# Patient Record
Sex: FEMALE | Race: BLACK OR AFRICAN AMERICAN | NOT HISPANIC OR LATINO | Employment: FULL TIME | RURAL
[De-identification: names, ages, dates, MRNs, and addresses within clinical notes are randomized per-mention and may not be internally consistent; named-entity substitution may affect disease eponyms.]

---

## 2018-01-15 LAB — CRC RECOMMENDATION EXT: NORMAL

## 2020-10-12 ENCOUNTER — HISTORICAL (OUTPATIENT)
Dept: ADMINISTRATIVE | Facility: HOSPITAL | Age: 55
End: 2020-10-12

## 2021-03-06 DIAGNOSIS — Z12.31 SCREENING MAMMOGRAM FOR HIGH-RISK PATIENT: Primary | ICD-10-CM

## 2021-06-30 DIAGNOSIS — E78.5 HYPERLIPIDEMIA, UNSPECIFIED HYPERLIPIDEMIA TYPE: Primary | ICD-10-CM

## 2021-06-30 RX ORDER — ATORVASTATIN CALCIUM 20 MG/1
20 TABLET, FILM COATED ORAL NIGHTLY
COMMUNITY
Start: 2021-03-11 | End: 2021-06-30 | Stop reason: SDUPTHER

## 2021-06-30 RX ORDER — ATORVASTATIN CALCIUM 20 MG/1
20 TABLET, FILM COATED ORAL NIGHTLY
Qty: 30 TABLET | Refills: 3 | Status: SHIPPED | OUTPATIENT
Start: 2021-06-30 | End: 2021-11-23 | Stop reason: SDUPTHER

## 2021-10-18 ENCOUNTER — HOSPITAL ENCOUNTER (OUTPATIENT)
Dept: RADIOLOGY | Facility: HOSPITAL | Age: 56
Discharge: HOME | End: 2021-10-18
Payer: COMMERCIAL

## 2021-10-18 VITALS — BODY MASS INDEX: 30.86 KG/M2 | HEIGHT: 66 IN | WEIGHT: 192 LBS

## 2021-10-18 DIAGNOSIS — Z12.31 SCREENING MAMMOGRAM FOR HIGH-RISK PATIENT: ICD-10-CM

## 2021-10-18 PROCEDURE — 77067 SCR MAMMO BI INCL CAD: CPT | Mod: TC

## 2021-11-23 ENCOUNTER — OFFICE VISIT (OUTPATIENT)
Dept: FAMILY MEDICINE | Facility: CLINIC | Age: 56
End: 2021-11-23
Payer: COMMERCIAL

## 2021-11-23 VITALS
WEIGHT: 196.38 LBS | HEIGHT: 66 IN | HEART RATE: 76 BPM | BODY MASS INDEX: 31.56 KG/M2 | RESPIRATION RATE: 18 BRPM | OXYGEN SATURATION: 100 % | DIASTOLIC BLOOD PRESSURE: 82 MMHG | SYSTOLIC BLOOD PRESSURE: 135 MMHG

## 2021-11-23 DIAGNOSIS — N76.0 BACTERIAL VAGINOSIS: ICD-10-CM

## 2021-11-23 DIAGNOSIS — R79.89 ABNORMAL CBC: Primary | ICD-10-CM

## 2021-11-23 DIAGNOSIS — E78.5 HYPERLIPIDEMIA, UNSPECIFIED HYPERLIPIDEMIA TYPE: ICD-10-CM

## 2021-11-23 DIAGNOSIS — Z79.899 LONG-TERM USE OF HIGH-RISK MEDICATION: ICD-10-CM

## 2021-11-23 DIAGNOSIS — R79.9 ABNORMAL BLOOD CHEMISTRY: ICD-10-CM

## 2021-11-23 DIAGNOSIS — B96.89 BACTERIAL VAGINOSIS: ICD-10-CM

## 2021-11-23 DIAGNOSIS — E78.2 MIXED HYPERLIPIDEMIA: Chronic | ICD-10-CM

## 2021-11-23 LAB
ALBUMIN SERPL BCP-MCNC: 3.6 G/DL (ref 3.5–5)
ALBUMIN/GLOB SERPL: 0.7 {RATIO}
ALP SERPL-CCNC: 139 U/L (ref 41–108)
ALT SERPL W P-5'-P-CCNC: 20 U/L (ref 13–56)
ANION GAP SERPL CALCULATED.3IONS-SCNC: 10 MMOL/L (ref 7–16)
AST SERPL W P-5'-P-CCNC: 19 U/L (ref 15–37)
BASOPHILS # BLD AUTO: 0.03 K/UL (ref 0–0.2)
BASOPHILS NFR BLD AUTO: 1.2 % (ref 0–1)
BILIRUB SERPL-MCNC: 0.4 MG/DL (ref 0–1.2)
BUN SERPL-MCNC: 11 MG/DL (ref 7–18)
BUN/CREAT SERPL: 14 (ref 6–20)
CALCIUM SERPL-MCNC: 9.3 MG/DL (ref 8.5–10.1)
CHLORIDE SERPL-SCNC: 107 MMOL/L (ref 98–107)
CHOLEST SERPL-MCNC: 183 MG/DL (ref 0–200)
CHOLEST/HDLC SERPL: 3.6 {RATIO}
CO2 SERPL-SCNC: 28 MMOL/L (ref 21–32)
CREAT SERPL-MCNC: 0.8 MG/DL (ref 0.55–1.02)
DIFFERENTIAL METHOD BLD: ABNORMAL
EOSINOPHIL # BLD AUTO: 0.08 K/UL (ref 0–0.5)
EOSINOPHIL NFR BLD AUTO: 3.3 % (ref 1–4)
EOSINOPHIL NFR BLD MANUAL: 3 % (ref 1–4)
ERYTHROCYTE [DISTWIDTH] IN BLOOD BY AUTOMATED COUNT: 13.8 % (ref 11.5–14.5)
GLOBULIN SER-MCNC: 5 G/DL (ref 2–4)
GLUCOSE SERPL-MCNC: 94 MG/DL (ref 74–106)
HCT VFR BLD AUTO: 42.1 % (ref 38–47)
HDLC SERPL-MCNC: 51 MG/DL (ref 40–60)
HGB BLD-MCNC: 13.2 G/DL (ref 12–16)
IMM GRANULOCYTES # BLD AUTO: 0 K/UL (ref 0–0.04)
IMM GRANULOCYTES NFR BLD: 0 % (ref 0–0.4)
LDLC SERPL CALC-MCNC: 107 MG/DL
LDLC/HDLC SERPL: 2.1 {RATIO}
LYMPHOCYTES # BLD AUTO: 0.9 K/UL (ref 1–4.8)
LYMPHOCYTES NFR BLD AUTO: 37.2 % (ref 27–41)
LYMPHOCYTES NFR BLD MANUAL: 38 % (ref 27–41)
MCH RBC QN AUTO: 25.5 PG (ref 27–31)
MCHC RBC AUTO-ENTMCNC: 31.4 G/DL (ref 32–36)
MCV RBC AUTO: 81.3 FL (ref 80–96)
MONOCYTES # BLD AUTO: 0.26 K/UL (ref 0–0.8)
MONOCYTES NFR BLD AUTO: 10.7 % (ref 2–6)
MONOCYTES NFR BLD MANUAL: 10 % (ref 2–6)
MPC BLD CALC-MCNC: 10.3 FL (ref 9.4–12.4)
NEUTROPHILS # BLD AUTO: 1.15 K/UL (ref 1.8–7.7)
NEUTROPHILS NFR BLD AUTO: 47.6 % (ref 53–65)
NEUTS SEG NFR BLD MANUAL: 49 % (ref 50–62)
NONHDLC SERPL-MCNC: 132 MG/DL
NRBC # BLD AUTO: 0 X10E3/UL
NRBC, AUTO (.00): 0 %
PLATELET # BLD AUTO: 294 K/UL (ref 150–400)
PLATELET MORPHOLOGY: NORMAL
POTASSIUM SERPL-SCNC: 3.9 MMOL/L (ref 3.5–5.1)
PROT SERPL-MCNC: 8.6 G/DL (ref 6.4–8.2)
RBC # BLD AUTO: 5.18 M/UL (ref 4.2–5.4)
RBC MORPH BLD: NORMAL
SODIUM SERPL-SCNC: 141 MMOL/L (ref 136–145)
TRIGL SERPL-MCNC: 123 MG/DL (ref 35–150)
VLDLC SERPL-MCNC: 25 MG/DL
WBC # BLD AUTO: 2.42 K/UL (ref 4.5–11)

## 2021-11-23 PROCEDURE — 86235 NUCLEAR ANTIGEN ANTIBODY: CPT | Mod: ,,, | Performed by: CLINICAL MEDICAL LABORATORY

## 2021-11-23 PROCEDURE — 80061 LIPID PANEL: ICD-10-PCS | Mod: ,,, | Performed by: CLINICAL MEDICAL LABORATORY

## 2021-11-23 PROCEDURE — 85025 CBC WITH DIFFERENTIAL: ICD-10-PCS | Mod: ,,, | Performed by: CLINICAL MEDICAL LABORATORY

## 2021-11-23 PROCEDURE — 86225 ANTI-DNA ANTIBODY, DOUBLE-STRANDED: ICD-10-PCS | Mod: ,,, | Performed by: CLINICAL MEDICAL LABORATORY

## 2021-11-23 PROCEDURE — 87252 VIRUS INOCULATION TISSUE: CPT | Mod: 90,59,, | Performed by: CLINICAL MEDICAL LABORATORY

## 2021-11-23 PROCEDURE — 80061 LIPID PANEL: CPT | Mod: ,,, | Performed by: CLINICAL MEDICAL LABORATORY

## 2021-11-23 PROCEDURE — 36415 COLL VENOUS BLD VENIPUNCTURE: CPT | Performed by: NURSE PRACTITIONER

## 2021-11-23 PROCEDURE — 85025 COMPLETE CBC W/AUTO DIFF WBC: CPT | Mod: ,,, | Performed by: CLINICAL MEDICAL LABORATORY

## 2021-11-23 PROCEDURE — 80053 COMPREHENSIVE METABOLIC PANEL: ICD-10-PCS | Mod: ,,, | Performed by: CLINICAL MEDICAL LABORATORY

## 2021-11-23 PROCEDURE — 80053 COMPREHEN METABOLIC PANEL: CPT | Mod: ,,, | Performed by: CLINICAL MEDICAL LABORATORY

## 2021-11-23 PROCEDURE — 87252: ICD-10-PCS | Mod: 90,59,, | Performed by: CLINICAL MEDICAL LABORATORY

## 2021-11-23 PROCEDURE — 86038 ANTINUCLEAR ANTIBODIES: CPT | Mod: ,,, | Performed by: CLINICAL MEDICAL LABORATORY

## 2021-11-23 PROCEDURE — 99214 PR OFFICE/OUTPT VISIT, EST, LEVL IV, 30-39 MIN: ICD-10-PCS | Mod: ,,, | Performed by: NURSE PRACTITIONER

## 2021-11-23 PROCEDURE — 86225 DNA ANTIBODY NATIVE: CPT | Mod: ,,, | Performed by: CLINICAL MEDICAL LABORATORY

## 2021-11-23 PROCEDURE — 99214 OFFICE O/P EST MOD 30 MIN: CPT | Mod: ,,, | Performed by: NURSE PRACTITIONER

## 2021-11-23 PROCEDURE — 86235 ANTIEXTRACTABLE NUCLEAR AG: ICD-10-PCS | Mod: ,,, | Performed by: CLINICAL MEDICAL LABORATORY

## 2021-11-23 PROCEDURE — 87252 VIRUS INOCULATION TISSUE: CPT | Mod: 90,,, | Performed by: CLINICAL MEDICAL LABORATORY

## 2021-11-23 PROCEDURE — 86038 ANA EIA W/REFLEX DSDNA/ENA: ICD-10-PCS | Mod: ,,, | Performed by: CLINICAL MEDICAL LABORATORY

## 2021-11-23 RX ORDER — ATORVASTATIN CALCIUM 20 MG/1
20 TABLET, FILM COATED ORAL NIGHTLY
Qty: 90 TABLET | Refills: 3 | Status: SHIPPED | OUTPATIENT
Start: 2021-11-23 | End: 2022-12-05 | Stop reason: SDUPTHER

## 2021-11-23 RX ORDER — METRONIDAZOLE 7.5 MG/G
1 GEL VAGINAL 2 TIMES DAILY
Qty: 70 G | Refills: 3 | Status: SHIPPED | OUTPATIENT
Start: 2021-11-23 | End: 2023-09-18

## 2021-11-24 ENCOUNTER — TELEPHONE (OUTPATIENT)
Dept: FAMILY MEDICINE | Facility: CLINIC | Age: 56
End: 2021-11-24
Payer: COMMERCIAL

## 2021-11-24 DIAGNOSIS — D72.819 LEUKOPENIA, UNSPECIFIED TYPE: Primary | ICD-10-CM

## 2021-11-26 LAB
ANA SER QL: POSITIVE
DSDNA IGG SERPL IA-ACNC: 5 IU (ref 0–24)
DSDNA IGG SERPL IA-ACNC: NEGATIVE [IU]/ML
ENA AB SER QL IA: 186.2 EUS (ref 0–19.9)
ENA AB SER QL IA: POSITIVE

## 2021-11-28 DIAGNOSIS — M32.9 LUPUS: Primary | ICD-10-CM

## 2021-11-29 ENCOUNTER — TELEPHONE (OUTPATIENT)
Dept: FAMILY MEDICINE | Facility: CLINIC | Age: 56
End: 2021-11-29
Payer: COMMERCIAL

## 2021-11-30 LAB
ENA JO1 IGG SER-ACNC: <0.2 U
ENA RNP IGG SER IA-ACNC: 0.2 U
ENA SCL70 IGG SER IA-ACNC: <0.2 U
ENA SM IGG SER-ACNC: <0.2 U
ENA SS-A IGG SER-ACNC: >8 U
ENA SS-B IGG SER-ACNC: 7 U

## 2021-12-06 ENCOUNTER — OFFICE VISIT (OUTPATIENT)
Dept: FAMILY MEDICINE | Facility: CLINIC | Age: 56
End: 2021-12-06
Payer: COMMERCIAL

## 2021-12-06 VITALS
HEIGHT: 66 IN | DIASTOLIC BLOOD PRESSURE: 85 MMHG | SYSTOLIC BLOOD PRESSURE: 131 MMHG | WEIGHT: 194 LBS | BODY MASS INDEX: 31.18 KG/M2 | OXYGEN SATURATION: 97 % | HEART RATE: 78 BPM | RESPIRATION RATE: 18 BRPM

## 2021-12-06 DIAGNOSIS — J06.9 VIRAL URI WITH COUGH: Primary | ICD-10-CM

## 2021-12-06 LAB
CTP QC/QA: YES
S PYO RRNA THROAT QL PROBE: NEGATIVE

## 2021-12-06 PROCEDURE — 99213 OFFICE O/P EST LOW 20 MIN: CPT | Mod: 25,,, | Performed by: NURSE PRACTITIONER

## 2021-12-06 PROCEDURE — 87880 POCT RAPID STREP A: ICD-10-PCS | Mod: QW,,, | Performed by: NURSE PRACTITIONER

## 2021-12-06 PROCEDURE — 96372 THER/PROPH/DIAG INJ SC/IM: CPT | Mod: ,,, | Performed by: NURSE PRACTITIONER

## 2021-12-06 PROCEDURE — 99213 PR OFFICE/OUTPT VISIT, EST, LEVL III, 20-29 MIN: ICD-10-PCS | Mod: 25,,, | Performed by: NURSE PRACTITIONER

## 2021-12-06 PROCEDURE — 96372 PR INJECTION,THERAP/PROPH/DIAG2ST, IM OR SUBCUT: ICD-10-PCS | Mod: ,,, | Performed by: NURSE PRACTITIONER

## 2021-12-06 PROCEDURE — 87880 STREP A ASSAY W/OPTIC: CPT | Mod: QW,,, | Performed by: NURSE PRACTITIONER

## 2021-12-06 RX ORDER — DEXAMETHASONE SODIUM PHOSPHATE 4 MG/ML
4 INJECTION, SOLUTION INTRA-ARTICULAR; INTRALESIONAL; INTRAMUSCULAR; INTRAVENOUS; SOFT TISSUE
Status: COMPLETED | OUTPATIENT
Start: 2021-12-06 | End: 2021-12-06

## 2021-12-06 RX ADMIN — DEXAMETHASONE SODIUM PHOSPHATE 4 MG: 4 INJECTION, SOLUTION INTRA-ARTICULAR; INTRALESIONAL; INTRAMUSCULAR; INTRAVENOUS; SOFT TISSUE at 09:12

## 2021-12-10 ENCOUNTER — IMMUNIZATION (OUTPATIENT)
Dept: FAMILY MEDICINE | Facility: CLINIC | Age: 56
End: 2021-12-10
Payer: COMMERCIAL

## 2021-12-10 DIAGNOSIS — Z23 NEED FOR VACCINATION: Primary | ICD-10-CM

## 2021-12-10 PROCEDURE — 0064A COVID-19, MRNA, LNP-S, PF, 100 MCG/0.25 ML DOSE VACCINE (MODERNA BOOSTER): ICD-10-PCS | Mod: ,,, | Performed by: NURSE PRACTITIONER

## 2021-12-10 PROCEDURE — 91306 COVID-19, MRNA, LNP-S, PF, 100 MCG/0.25 ML DOSE VACCINE (MODERNA BOOSTER): CPT | Mod: ,,, | Performed by: NURSE PRACTITIONER

## 2021-12-10 PROCEDURE — 0064A COVID-19, MRNA, LNP-S, PF, 100 MCG/0.25 ML DOSE VACCINE (MODERNA BOOSTER): CPT | Mod: ,,, | Performed by: NURSE PRACTITIONER

## 2021-12-10 PROCEDURE — 91306 COVID-19, MRNA, LNP-S, PF, 100 MCG/0.25 ML DOSE VACCINE (MODERNA BOOSTER): ICD-10-PCS | Mod: ,,, | Performed by: NURSE PRACTITIONER

## 2022-02-22 ENCOUNTER — OFFICE VISIT (OUTPATIENT)
Dept: FAMILY MEDICINE | Facility: CLINIC | Age: 57
End: 2022-02-22
Payer: COMMERCIAL

## 2022-02-22 VITALS
OXYGEN SATURATION: 98 % | BODY MASS INDEX: 32.3 KG/M2 | WEIGHT: 201 LBS | RESPIRATION RATE: 18 BRPM | SYSTOLIC BLOOD PRESSURE: 133 MMHG | HEIGHT: 66 IN | DIASTOLIC BLOOD PRESSURE: 84 MMHG | HEART RATE: 71 BPM

## 2022-02-22 DIAGNOSIS — U07.1 LABORATORY CONFIRMED DIAGNOSIS OF COVID-19: Primary | ICD-10-CM

## 2022-02-22 DIAGNOSIS — Z11.52 ENCOUNTER FOR SCREENING LABORATORY TESTING FOR SEVERE ACUTE RESPIRATORY SYNDROME CORONAVIRUS 2 (SARS-COV-2): ICD-10-CM

## 2022-02-22 DIAGNOSIS — J10.1 INFLUENZA B: ICD-10-CM

## 2022-02-22 LAB
CTP QC/QA: YES
FLUAV AG NPH QL: NEGATIVE
FLUBV AG NPH QL: POSITIVE
SARS-COV-2 AG RESP QL IA.RAPID: POSITIVE

## 2022-02-22 PROCEDURE — 3008F PR BODY MASS INDEX (BMI) DOCUMENTED: ICD-10-PCS | Mod: ,,, | Performed by: NURSE PRACTITIONER

## 2022-02-22 PROCEDURE — 3008F BODY MASS INDEX DOCD: CPT | Mod: ,,, | Performed by: NURSE PRACTITIONER

## 2022-02-22 PROCEDURE — 99214 OFFICE O/P EST MOD 30 MIN: CPT | Mod: ,,, | Performed by: NURSE PRACTITIONER

## 2022-02-22 PROCEDURE — 1159F PR MEDICATION LIST DOCUMENTED IN MEDICAL RECORD: ICD-10-PCS | Mod: ,,, | Performed by: NURSE PRACTITIONER

## 2022-02-22 PROCEDURE — 1159F MED LIST DOCD IN RCRD: CPT | Mod: ,,, | Performed by: NURSE PRACTITIONER

## 2022-02-22 PROCEDURE — 3079F PR MOST RECENT DIASTOLIC BLOOD PRESSURE 80-89 MM HG: ICD-10-PCS | Mod: ,,, | Performed by: NURSE PRACTITIONER

## 2022-02-22 PROCEDURE — 87428 POCT SARS-COV2 (COVID) WITH FLU ANTIGEN: ICD-10-PCS | Mod: QW,,, | Performed by: NURSE PRACTITIONER

## 2022-02-22 PROCEDURE — 99214 PR OFFICE/OUTPT VISIT, EST, LEVL IV, 30-39 MIN: ICD-10-PCS | Mod: ,,, | Performed by: NURSE PRACTITIONER

## 2022-02-22 PROCEDURE — 3075F SYST BP GE 130 - 139MM HG: CPT | Mod: ,,, | Performed by: NURSE PRACTITIONER

## 2022-02-22 PROCEDURE — 87428 SARSCOV & INF VIR A&B AG IA: CPT | Mod: QW,,, | Performed by: NURSE PRACTITIONER

## 2022-02-22 PROCEDURE — 3079F DIAST BP 80-89 MM HG: CPT | Mod: ,,, | Performed by: NURSE PRACTITIONER

## 2022-02-22 PROCEDURE — 3075F PR MOST RECENT SYSTOLIC BLOOD PRESS GE 130-139MM HG: ICD-10-PCS | Mod: ,,, | Performed by: NURSE PRACTITIONER

## 2022-02-22 RX ORDER — OSELTAMIVIR PHOSPHATE 75 MG/1
75 CAPSULE ORAL 2 TIMES DAILY
Qty: 10 CAPSULE | Refills: 0 | Status: SHIPPED | OUTPATIENT
Start: 2022-02-22 | End: 2022-02-27

## 2022-02-22 RX ORDER — HYDROXYCHLOROQUINE SULFATE 200 MG/1
200 TABLET, FILM COATED ORAL 2 TIMES DAILY
COMMUNITY
Start: 2022-02-10

## 2022-02-22 RX ORDER — METFORMIN HYDROCHLORIDE 500 MG/1
500 TABLET ORAL 3 TIMES DAILY
COMMUNITY
Start: 2022-02-15 | End: 2023-09-18

## 2022-02-22 RX ORDER — NALTREXONE HYDROCHLORIDE 50 MG/1
25 TABLET, FILM COATED ORAL NIGHTLY
COMMUNITY
Start: 2022-02-15 | End: 2023-09-18

## 2022-02-22 RX ORDER — ESTRADIOL 0.1 MG/G
CREAM VAGINAL
COMMUNITY
Start: 2022-01-06 | End: 2023-09-18

## 2022-02-22 RX ORDER — PILOCARPINE HYDROCHLORIDE 5 MG/1
5 TABLET, FILM COATED ORAL 2 TIMES DAILY PRN
COMMUNITY
Start: 2022-02-09 | End: 2023-09-18

## 2022-02-22 NOTE — LETTER
February 22, 2022      [address] [address]  Phone: [phone]  Fax: [phone]       Patient: Mervat Godwin   YOB: 1965  Date of Visit: 02/22/2022    To Whom It May Concern:    Kelly Godwin  was at [facility] on 02/22/2022 and diagnosed with Covid and flu type B. The patient may return to work/school on 2/28/22 with no restrictions. If you have any questions or concerns, or if I can be of further assistance, please do not hesitate to contact me.    Sincerely,    DINESH Field

## 2022-02-22 NOTE — PATIENT INSTRUCTIONS
Self quarantine x 5 days, then wear mask x 5 days. May return to work on 2/28/22 if fever free.  Drink lots of fluids, OTC cough meds as needed.

## 2022-02-22 NOTE — PROGRESS NOTES
DINESH Field   [facility]  [address]   [place] [address]     PATIENT NAME: Mervat Godwin  : 1965  DATE: 22  MRN: [id number]      Billing Provider: DINESH Field  Level of Service:   Patient PCP Information     Provider PCP Type    DINESH Field General          Reason for Visit / Chief Complaint: Sinusitis       Update PCP  Update Chief Complaint         History of Present Illness / Problem Focused Workflow     Mervat Godwin is a 56 y.o. female presents to the clinic  With 3 day history of sinus congestion/pressure and headache with no fever or bodyache. She is using flonase and taking otc decongestants with little help.    She is working at [employer] in [employer].      Review of Systems     Review of Systems   Constitutional: Negative for fatigue.   HENT: Positive for nasal congestion. Negative for postnasal drip and sore throat.    Respiratory: Negative for cough, chest tightness and shortness of breath.    Cardiovascular: Negative for chest pain, palpitations and leg swelling.   Gastrointestinal: Negative for nausea, vomiting and reflux.   Neurological: Positive for headaches. Negative for weakness and memory loss.   Psychiatric/Behavioral: Negative for confusion and sleep disturbance.        Medical / Social / Family History   History reviewed. No pertinent past medical history.    Past Surgical History:   Procedure Laterality Date    HYSTERECTOMY      OOPHORECTOMY         Social History  Ms.  reports that she has never smoked. She has never used smokeless tobacco. She reports that she does not drink alcohol and does not use drugs.    Family History  Ms.'s family history includes Breast cancer in her maternal grandmother.    Medications and Allergies     Medications  Outpatient Medications Marked as Taking for the 22 encounter (Office Visit) with DINESH Field   Medication Sig Dispense Refill    atorvastatin (LIPITOR) 20 MG tablet Take 1 tablet (20 mg  "total) by mouth nightly. 90 tablet 3    estradioL (ESTRACE) 0.01 % (0.1 mg/gram) vaginal cream Place vaginally.      hydrOXYchloroQUINE (PLAQUENIL) 200 mg tablet Take 200 mg by mouth 2 (two) times daily.      metFORMIN (GLUCOPHAGE) 500 MG tablet Take 500 mg by mouth 3 (three) times daily.      metroNIDAZOLE (METROGEL) 0.75 % vaginal gel Place 1 applicator vaginally 2 (two) times daily. 70 g 3    naltrexone (DEPADE) 50 mg tablet Take 25 mg by mouth nightly.      pilocarpine (SALAGEN) 5 MG Tab Take 5 mg by mouth 2 (two) times daily as needed.         Allergies  Review of patient's allergies indicates:  No Known Allergies    Physical Examination     Vitals:    02/22/22 1453   BP: 133/84   Pulse: 71   Resp: 18   SpO2: 98%   Weight: 91.2 kg (201 lb)   Height: 5' 6" (1.676 m)      Physical Exam  Constitutional:       Appearance: Normal appearance.   HENT:      Nose: Congestion present.      Mouth/Throat:      Pharynx: No oropharyngeal exudate or posterior oropharyngeal erythema.   Cardiovascular:      Rate and Rhythm: Normal rate and regular rhythm.      Pulses: Normal pulses.      Heart sounds: Normal heart sounds.   Pulmonary:      Effort: Pulmonary effort is normal.      Breath sounds: Normal breath sounds.   Lymphadenopathy:      Cervical: No cervical adenopathy.   Skin:     General: Skin is warm and dry.   Neurological:      Mental Status: She is alert and oriented to person, place, and time.   Psychiatric:         Mood and Affect: Mood normal.         Behavior: Behavior normal.          Assessment and Plan (including Health Maintenance)      Problem List  Smart Sets  Document Outside HM   :    Plan:  Will treat with tamiflu 75mg bid x 5 days, drink lots of fluid,  Tylenol for fever, body aches. OTC congestion meds as needed.  Stay home x  5 days and may return to work on 2/28 with mask.         Health Maintenance Due   Topic Date Due    Hepatitis C Screening  Never done    HIV Screening  Never done    " TETANUS VACCINE  Never done    Colorectal Cancer Screening  Never done    Shingles Vaccine (1 of 2) Never done       Problem List Items Addressed This Visit        ID    Influenza B    Relevant Medications    oseltamivir (TAMIFLU) 75 MG capsule       Other    Encounter for screening laboratory testing for severe acute respiratory syndrome coronavirus 2 (SARS-CoV-2)    Relevant Orders    POCT SARS-COV2 (COVID) with Flu Antigen (Completed)    Laboratory confirmed diagnosis of COVID-19 - Primary        Laboratory confirmed diagnosis of COVID-19    Encounter for screening laboratory testing for severe acute respiratory syndrome coronavirus 2 (SARS-CoV-2)  -     POCT SARS-COV2 (COVID) with Flu Antigen    Influenza B  -     oseltamivir (TAMIFLU) 75 MG capsule; Take 1 capsule (75 mg total) by mouth 2 (two) times daily. for 5 days  Dispense: 10 capsule; Refill: 0       Health Maintenance Topics with due status: Not Due       Topic Last Completion Date    Mammogram 10/18/2021    Lipid Panel 11/23/2021       Procedures     No future appointments.     Follow up if symptoms worsen or fail to improve.     Signature:  DINESH Field    Date of encounter: 2/22/22

## 2022-07-25 ENCOUNTER — OFFICE VISIT (OUTPATIENT)
Dept: FAMILY MEDICINE | Facility: CLINIC | Age: 57
End: 2022-07-25
Payer: COMMERCIAL

## 2022-07-25 VITALS
BODY MASS INDEX: 32.3 KG/M2 | DIASTOLIC BLOOD PRESSURE: 70 MMHG | RESPIRATION RATE: 18 BRPM | WEIGHT: 201 LBS | HEIGHT: 66 IN | SYSTOLIC BLOOD PRESSURE: 126 MMHG | OXYGEN SATURATION: 98 % | HEART RATE: 87 BPM | TEMPERATURE: 98 F

## 2022-07-25 DIAGNOSIS — U07.1 COVID-19: Primary | ICD-10-CM

## 2022-07-25 PROCEDURE — 3078F DIAST BP <80 MM HG: CPT | Mod: ,,, | Performed by: NURSE PRACTITIONER

## 2022-07-25 PROCEDURE — 99213 OFFICE O/P EST LOW 20 MIN: CPT | Mod: ,,, | Performed by: NURSE PRACTITIONER

## 2022-07-25 PROCEDURE — 1160F PR REVIEW ALL MEDS BY PRESCRIBER/CLIN PHARMACIST DOCUMENTED: ICD-10-PCS | Mod: ,,, | Performed by: NURSE PRACTITIONER

## 2022-07-25 PROCEDURE — 99213 PR OFFICE/OUTPT VISIT, EST, LEVL III, 20-29 MIN: ICD-10-PCS | Mod: ,,, | Performed by: NURSE PRACTITIONER

## 2022-07-25 PROCEDURE — 1159F PR MEDICATION LIST DOCUMENTED IN MEDICAL RECORD: ICD-10-PCS | Mod: ,,, | Performed by: NURSE PRACTITIONER

## 2022-07-25 PROCEDURE — 3074F PR MOST RECENT SYSTOLIC BLOOD PRESSURE < 130 MM HG: ICD-10-PCS | Mod: ,,, | Performed by: NURSE PRACTITIONER

## 2022-07-25 PROCEDURE — 1159F MED LIST DOCD IN RCRD: CPT | Mod: ,,, | Performed by: NURSE PRACTITIONER

## 2022-07-25 PROCEDURE — 1160F RVW MEDS BY RX/DR IN RCRD: CPT | Mod: ,,, | Performed by: NURSE PRACTITIONER

## 2022-07-25 PROCEDURE — 3008F PR BODY MASS INDEX (BMI) DOCUMENTED: ICD-10-PCS | Mod: ,,, | Performed by: NURSE PRACTITIONER

## 2022-07-25 PROCEDURE — 3074F SYST BP LT 130 MM HG: CPT | Mod: ,,, | Performed by: NURSE PRACTITIONER

## 2022-07-25 PROCEDURE — 3078F PR MOST RECENT DIASTOLIC BLOOD PRESSURE < 80 MM HG: ICD-10-PCS | Mod: ,,, | Performed by: NURSE PRACTITIONER

## 2022-07-25 PROCEDURE — 3008F BODY MASS INDEX DOCD: CPT | Mod: ,,, | Performed by: NURSE PRACTITIONER

## 2022-07-25 RX ORDER — ALBUTEROL SULFATE 90 UG/1
2 AEROSOL, METERED RESPIRATORY (INHALATION) EVERY 6 HOURS PRN
Qty: 18 G | Refills: 0 | Status: SHIPPED | OUTPATIENT
Start: 2022-07-25 | End: 2022-09-28

## 2022-07-25 RX ORDER — FLUTICASONE PROPIONATE 50 MCG
SPRAY, SUSPENSION (ML) NASAL
COMMUNITY
Start: 2022-07-22 | End: 2023-09-18

## 2022-07-25 NOTE — LETTER
July 25, 2022      [address] [address]  Phone: [phone]  Fax: [phone]       Patient: Mervat Godwin   YOB: 1965  Date of Visit: 07/22/2022    To Whom It May Concern:    Kelly Godwin  was at [facility] on 07/22/2022. The patient may return to work/school on with no restrictions. If you have any questions or concerns, or if I can be of further assistance, please do not hesitate to contact me.    Sincerely,    Aisha Lopes LPN

## 2022-07-25 NOTE — LETTER
July 25, 2022      [address] [address]  Phone: [phone]  Fax: [phone]       Patient: Mervat Godwin   YOB: 1965Date of Visit: 07/22/2022    To Whom It May Concern:    Kelly Godwin  was at [facility] on 07/22/2022. The patient may return to work/school on 08/01/2022 with no restrictions. If you have any questions or concerns, or if I can be of further assistance, please do not hesitate to contact me.    Sincerely,    Aisha Lopes LPN

## 2022-07-25 NOTE — PROGRESS NOTES
DINESH Schumacher   [facility]  [address]  [address]  [place]  [address]      PATIENT NAME: Mervat Godwin  : 1965  DATE: 22  MRN: [id number]      Billing Provider: DINESH Schumacher  Level of Service: [place] OFFICE/OUTPT VISIT, EST, LEVL III, 20-29 MIN  Patient PCP Information     Provider PCP Type    DINESH Field General          Reason for Visit / Chief Complaint: Follow-up       Update PCP  Update Chief Complaint         History of Present Illness / Problem Focused Workflow     Mervat Godwin presents to the clinic with Follow-up     55 y/o BF presents to clinic-seen in car-for + COVID home test symptoms started Friday-red/watery eyes, cough, fever up to 101.0 F      Review of Systems     @Review of Systems   Constitutional: Positive for chills, fatigue and fever.   HENT: Positive for nasal congestion and postnasal drip. Negative for ear pain and sore throat.    Respiratory: Positive for cough. Negative for shortness of breath.    Neurological: Negative for headaches.       Medical / Social / Family History   History reviewed. No pertinent past medical history.    Past Surgical History:   Procedure Laterality Date    HYSTERECTOMY      OOPHORECTOMY         Social History  Ms.  reports that she has never smoked. She has never used smokeless tobacco. She reports that she does not drink alcohol and does not use drugs.    Family History  Ms.'s family history includes Breast cancer in her maternal grandmother.    Medications and Allergies     Medications  No outpatient medications have been marked as taking for the 22 encounter (Office Visit) with DINESH Schumacher.       Allergies  Review of patient's allergies indicates:  No Known Allergies    Physical Examination     Vitals:    22 1457   BP: 126/70   Pulse: 87   Resp: 18   Temp: 98.2 °F (36.8 °C)     Physical Exam  Vitals and nursing note reviewed.   Constitutional:       Appearance: Normal appearance.   HENT:       Right Ear: Tympanic membrane, ear canal and external ear normal.      Left Ear: Tympanic membrane, ear canal and external ear normal.      Nose: Congestion and rhinorrhea present.      Mouth/Throat:      Pharynx: Posterior oropharyngeal erythema present.   Eyes:      General:         Right eye: Discharge (clear/watery) present.         Left eye: Discharge (clear/watery) present.  Cardiovascular:      Rate and Rhythm: Normal rate and regular rhythm.      Heart sounds: Normal heart sounds.   Pulmonary:      Effort: Pulmonary effort is normal.      Breath sounds: Normal breath sounds.   Abdominal:      General: Abdomen is flat. Bowel sounds are normal.      Palpations: Abdomen is soft.   Skin:     General: Skin is warm and dry.   Neurological:      Mental Status: She is alert and oriented to person, place, and time.   Psychiatric:         Mood and Affect: Mood normal.         Behavior: Behavior normal.               Lab Results   Component Value Date    WBC 2.42 (L) 11/23/2021    HGB 13.2 11/23/2021    HCT 42.1 11/23/2021    MCV 81.3 11/23/2021     11/23/2021          Sodium   Date Value Ref Range Status   11/23/2021 141 136 - 145 mmol/L Final     Potassium   Date Value Ref Range Status   11/23/2021 3.9 3.5 - 5.1 mmol/L Final     Chloride   Date Value Ref Range Status   11/23/2021 107 98 - 107 mmol/L Final     CO2   Date Value Ref Range Status   11/23/2021 28 21 - 32 mmol/L Final     Glucose   Date Value Ref Range Status   11/23/2021 94 74 - 106 mg/dL Final     BUN   Date Value Ref Range Status   11/23/2021 11 7 - 18 mg/dL Final     Creatinine   Date Value Ref Range Status   11/23/2021 0.80 0.55 - 1.02 mg/dL Final     Calcium   Date Value Ref Range Status   11/23/2021 9.3 8.5 - 10.1 mg/dL Final     Total Protein   Date Value Ref Range Status   11/23/2021 8.6 (H) 6.4 - 8.2 g/dL Final     Albumin   Date Value Ref Range Status   11/23/2021 3.6 3.5 - 5.0 g/dL Final     Bilirubin, Total   Date Value Ref Range Status    11/23/2021 0.4 0.0 - 1.2 mg/dL Final     Alk Phos   Date Value Ref Range Status   11/23/2021 139 (H) 41 - 108 U/L Final     AST   Date Value Ref Range Status   11/23/2021 19 15 - 37 U/L Final     ALT   Date Value Ref Range Status   11/23/2021 20 13 - 56 U/L Final     Anion Gap   Date Value Ref Range Status   11/23/2021 10 7 - 16 mmol/L Final     eGFR    Date Value Ref Range Status   11/23/2021 96 >=60 mL/min/1.73m² Final      Mammo Digital Screening Bilat  Narrative: Result:   Mammo Digital Screening Bilat     History:  Patient is 55 y.o. and is seen for a screening mammogram.    Positive family history of breast cancer.    Films Compared:  Compared to: 10/12/2020 Mammo Digital Screening Bilat (No Change)     Findings:  The breasts have scattered areas of fibroglandular density. There is no   evidence of suspicious masses, calcifications, or other abnormal findings.  Impression: Bilateral  There is no mammographic evidence of malignancy.    BI-RADS Category:   Overall: 1 - Negative     Recommendation:  Routine screening mammogram in 1 year is recommended.    Your estimated lifetime risk of breast cancer (to age 85) based on   Tyrer-Cuzick risk assessment model is Tyrer-Cuzick: 15.58 %. According to   the American Cancer Society, patients with a lifetime breast cancer risk   of 20% or higher might benefit from supplemental screening tests.     Procedures   Assessment and Plan (including Health Maintenance)      Problem List  Smart Sets  Document Outside HM   :    Plan:         Health Maintenance Due   Topic Date Due    Hepatitis C Screening  Never done    HIV Screening  Never done    TETANUS VACCINE  Never done    Colorectal Cancer Screening  Never done    Shingles Vaccine (1 of 2) Never done    COVID-19 Vaccine (4 - Booster for Moderna series) 04/10/2022       Problem List Items Addressed This Visit        ID    COVID-19 - Primary    Current Assessment & Plan     Quarantine x 5 days  If  respiratory distress go to ER  Start paxlovid today  Rx for ventolin           Relevant Medications    nirmatrelvir-ritonavir 300 mg (150 mg x 2)-100 mg copackaged tablets (EUA)    albuterol (VENTOLIN HFA) 90 mcg/actuation inhaler          Health Maintenance Topics with due status: Not Due       Topic Last Completion Date    Influenza Vaccine 10/06/2021    Mammogram 10/18/2021    Lipid Panel 11/23/2021       No future appointments.     Follow up if symptoms worsen or fail to improve.     Signature:  Silke Gibbons AKASH  [facility]  [id number] [place] [address], [place]  [address]    Date of encounter: 7/25/22

## 2022-09-21 ENCOUNTER — OFFICE VISIT (OUTPATIENT)
Dept: FAMILY MEDICINE | Facility: CLINIC | Age: 57
End: 2022-09-21
Payer: COMMERCIAL

## 2022-09-21 VITALS
HEART RATE: 95 BPM | OXYGEN SATURATION: 96 % | BODY MASS INDEX: 32.44 KG/M2 | DIASTOLIC BLOOD PRESSURE: 78 MMHG | SYSTOLIC BLOOD PRESSURE: 129 MMHG | TEMPERATURE: 99 F | RESPIRATION RATE: 20 BRPM | HEIGHT: 66 IN

## 2022-09-21 DIAGNOSIS — R05.9 COUGHING: ICD-10-CM

## 2022-09-21 DIAGNOSIS — J06.9 UPPER RESPIRATORY TRACT INFECTION, UNSPECIFIED TYPE: Primary | ICD-10-CM

## 2022-09-21 LAB
CTP QC/QA: YES
SARS-COV-2 AG RESP QL IA.RAPID: NEGATIVE

## 2022-09-21 PROCEDURE — 1159F MED LIST DOCD IN RCRD: CPT | Mod: ,,, | Performed by: NURSE PRACTITIONER

## 2022-09-21 PROCEDURE — 99213 PR OFFICE/OUTPT VISIT, EST, LEVL III, 20-29 MIN: ICD-10-PCS | Mod: 25,,, | Performed by: NURSE PRACTITIONER

## 2022-09-21 PROCEDURE — 3008F PR BODY MASS INDEX (BMI) DOCUMENTED: ICD-10-PCS | Mod: ,,, | Performed by: NURSE PRACTITIONER

## 2022-09-21 PROCEDURE — 3074F PR MOST RECENT SYSTOLIC BLOOD PRESSURE < 130 MM HG: ICD-10-PCS | Mod: ,,, | Performed by: NURSE PRACTITIONER

## 2022-09-21 PROCEDURE — 96372 PR INJECTION,THERAP/PROPH/DIAG2ST, IM OR SUBCUT: ICD-10-PCS | Mod: ,,, | Performed by: NURSE PRACTITIONER

## 2022-09-21 PROCEDURE — 87426 SARS CORONAVIRUS 2 ANTIGEN POCT: ICD-10-PCS | Mod: QW,,, | Performed by: NURSE PRACTITIONER

## 2022-09-21 PROCEDURE — 3078F DIAST BP <80 MM HG: CPT | Mod: ,,, | Performed by: NURSE PRACTITIONER

## 2022-09-21 PROCEDURE — 1159F PR MEDICATION LIST DOCUMENTED IN MEDICAL RECORD: ICD-10-PCS | Mod: ,,, | Performed by: NURSE PRACTITIONER

## 2022-09-21 PROCEDURE — 87426 SARSCOV CORONAVIRUS AG IA: CPT | Mod: QW,,, | Performed by: NURSE PRACTITIONER

## 2022-09-21 PROCEDURE — 3008F BODY MASS INDEX DOCD: CPT | Mod: ,,, | Performed by: NURSE PRACTITIONER

## 2022-09-21 PROCEDURE — 3078F PR MOST RECENT DIASTOLIC BLOOD PRESSURE < 80 MM HG: ICD-10-PCS | Mod: ,,, | Performed by: NURSE PRACTITIONER

## 2022-09-21 PROCEDURE — 96372 THER/PROPH/DIAG INJ SC/IM: CPT | Mod: ,,, | Performed by: NURSE PRACTITIONER

## 2022-09-21 PROCEDURE — 99213 OFFICE O/P EST LOW 20 MIN: CPT | Mod: 25,,, | Performed by: NURSE PRACTITIONER

## 2022-09-21 PROCEDURE — 3074F SYST BP LT 130 MM HG: CPT | Mod: ,,, | Performed by: NURSE PRACTITIONER

## 2022-09-21 RX ORDER — DEXAMETHASONE SODIUM PHOSPHATE 4 MG/ML
4 INJECTION, SOLUTION INTRA-ARTICULAR; INTRALESIONAL; INTRAMUSCULAR; INTRAVENOUS; SOFT TISSUE
Status: COMPLETED | OUTPATIENT
Start: 2022-09-21 | End: 2022-09-21

## 2022-09-21 RX ADMIN — DEXAMETHASONE SODIUM PHOSPHATE 4 MG: 4 INJECTION, SOLUTION INTRA-ARTICULAR; INTRALESIONAL; INTRAMUSCULAR; INTRAVENOUS; SOFT TISSUE at 03:09

## 2022-09-21 NOTE — PROGRESS NOTES
DINESH Field   [facility]  [address]   [place] [address]     PATIENT NAME: Mervat Godwin  : 1965  DATE: 22  MRN: [id number]      Billing Provider: DINESH Field  Level of Service:   Patient PCP Information       Provider PCP Type    DINESH Field General            Reason for Visit / Chief Complaint: Cough and Nasal Congestion (Clear nasal drainage )       Update PCP  Update Chief Complaint         History of Present Illness / Problem Focused Workflow     Mervat Godwin is a 56 y.o. female presents to the clinic  with 3 day history of cough, congestion with clear nasal discharge, no fever or chills but feels bad.    Visit conducted in patients car due to covid with limited exam        Review of Systems     Review of Systems   Constitutional:  Negative for fatigue and fever.   HENT:  Positive for nasal congestion and rhinorrhea. Negative for sore throat.    Respiratory:  Positive for cough. Negative for chest tightness, shortness of breath and wheezing.    Cardiovascular:  Negative for chest pain, palpitations and leg swelling.   Gastrointestinal:  Negative for nausea, vomiting and reflux.   Neurological:  Negative for weakness and memory loss.   Psychiatric/Behavioral:  Negative for confusion and sleep disturbance.       Medical / Social / Family History   No past medical history on file.    Past Surgical History:   Procedure Laterality Date    HYSTERECTOMY      OOPHORECTOMY         Social History  Ms.  reports that she has never smoked. She has never used smokeless tobacco. She reports that she does not drink alcohol and does not use drugs.    Family History  Ms.'s family history includes Breast cancer in her maternal grandmother.    Medications and Allergies     Medications  Outpatient Medications Marked as Taking for the 22 encounter (Office Visit) with DINESH Field   Medication Sig Dispense Refill    albuterol (VENTOLIN HFA) 90 mcg/actuation inhaler Inhale 2  "puffs into the lungs every 6 (six) hours as needed for Wheezing. Rescue 18 g 0    atorvastatin (LIPITOR) 20 MG tablet Take 1 tablet (20 mg total) by mouth nightly. 90 tablet 3    hydrOXYchloroQUINE (PLAQUENIL) 200 mg tablet Take 200 mg by mouth 2 (two) times daily.         Allergies  Review of patient's allergies indicates:  No Known Allergies    Physical Examination     Vitals:    09/21/22 1445   BP: 129/78   BP Location: Left arm   Patient Position: Sitting   BP Method: X-Large (Automatic)   Pulse: 95   Resp: 20   Temp: 98.7 °F (37.1 °C)   TempSrc: Oral   SpO2: 96%   Height: 5' 6" (1.676 m)      Physical Exam  Constitutional:       Appearance: Normal appearance.   HENT:      Nose:      Comments: No facial tenderness to palp     Mouth/Throat:      Mouth: Mucous membranes are moist.   Eyes:      Conjunctiva/sclera: Conjunctivae normal.   Cardiovascular:      Rate and Rhythm: Normal rate and regular rhythm.      Pulses: Normal pulses.      Heart sounds: Normal heart sounds.   Pulmonary:      Effort: Pulmonary effort is normal.      Breath sounds: Normal breath sounds.   Lymphadenopathy:      Cervical:      Right cervical: No superficial, deep or posterior cervical adenopathy.     Left cervical: No superficial, deep or posterior cervical adenopathy.   Skin:     General: Skin is warm and dry.   Neurological:      Mental Status: She is alert and oriented to person, place, and time.        Assessment and Plan (including Health Maintenance)      Problem List  Smart Sets  Document Outside HM   :    Plan:  Rapid covid test is negative. Advises that  this is most likely viral respiratory infection. Encouraged to drink lots of fluids, take cough meds as needede  \notify me if symptoms worsen.      Health Maintenance Due   Topic Date Due    Hepatitis C Screening  Never done    HIV Screening  Never done    TETANUS VACCINE  Never done    Colorectal Cancer Screening  Never done    Shingles Vaccine (1 of 2) Never done    COVID-19 " Vaccine (4 - Booster for Moderna series) 04/10/2022    Mammogram  10/18/2022       Problem List Items Addressed This Visit          ENT    Upper respiratory tract infection       Pulmonary    Coughing - Primary    Relevant Orders    SARS Coronavirus 2 Antigen, POCT (Completed)     Coughing  -     SARS Coronavirus 2 Antigen, POCT    Upper respiratory tract infection, unspecified type       Health Maintenance Topics with due status: Not Due       Topic Last Completion Date    Lipid Panel 11/23/2021       Procedures     No future appointments.     No follow-ups on file.     Signature:  DINESH Field    Date of encounter: 9/21/22

## 2022-09-21 NOTE — PATIENT INSTRUCTIONS
Rapid covid test is negative. Advises that  this is most likely viral respiratory infection. Encouraged to drink lots of fluids, take cough meds as needede  \notify me if symptoms worsen.

## 2022-09-28 ENCOUNTER — OFFICE VISIT (OUTPATIENT)
Dept: FAMILY MEDICINE | Facility: CLINIC | Age: 57
End: 2022-09-28
Payer: COMMERCIAL

## 2022-09-28 VITALS
RESPIRATION RATE: 18 BRPM | HEART RATE: 85 BPM | WEIGHT: 198 LBS | DIASTOLIC BLOOD PRESSURE: 79 MMHG | TEMPERATURE: 98 F | BODY MASS INDEX: 31.82 KG/M2 | OXYGEN SATURATION: 100 % | HEIGHT: 66 IN | SYSTOLIC BLOOD PRESSURE: 148 MMHG

## 2022-09-28 DIAGNOSIS — J06.9 VIRAL UPPER RESPIRATORY TRACT INFECTION: ICD-10-CM

## 2022-09-28 DIAGNOSIS — J01.00 ACUTE MAXILLARY SINUSITIS, RECURRENCE NOT SPECIFIED: Primary | ICD-10-CM

## 2022-09-28 LAB
CTP QC/QA: YES
SARS-COV-2 AG RESP QL IA.RAPID: NEGATIVE

## 2022-09-28 PROCEDURE — 3008F BODY MASS INDEX DOCD: CPT | Mod: ,,, | Performed by: NURSE PRACTITIONER

## 2022-09-28 PROCEDURE — 3078F PR MOST RECENT DIASTOLIC BLOOD PRESSURE < 80 MM HG: ICD-10-PCS | Mod: ,,, | Performed by: NURSE PRACTITIONER

## 2022-09-28 PROCEDURE — 3078F DIAST BP <80 MM HG: CPT | Mod: ,,, | Performed by: NURSE PRACTITIONER

## 2022-09-28 PROCEDURE — 1159F MED LIST DOCD IN RCRD: CPT | Mod: ,,, | Performed by: NURSE PRACTITIONER

## 2022-09-28 PROCEDURE — 99213 PR OFFICE/OUTPT VISIT, EST, LEVL III, 20-29 MIN: ICD-10-PCS | Mod: ,,, | Performed by: NURSE PRACTITIONER

## 2022-09-28 PROCEDURE — 1159F PR MEDICATION LIST DOCUMENTED IN MEDICAL RECORD: ICD-10-PCS | Mod: ,,, | Performed by: NURSE PRACTITIONER

## 2022-09-28 PROCEDURE — 87426 SARS CORONAVIRUS 2 ANTIGEN POCT: ICD-10-PCS | Mod: QW,,, | Performed by: NURSE PRACTITIONER

## 2022-09-28 PROCEDURE — 3077F SYST BP >= 140 MM HG: CPT | Mod: ,,, | Performed by: NURSE PRACTITIONER

## 2022-09-28 PROCEDURE — 3077F PR MOST RECENT SYSTOLIC BLOOD PRESSURE >= 140 MM HG: ICD-10-PCS | Mod: ,,, | Performed by: NURSE PRACTITIONER

## 2022-09-28 PROCEDURE — 87426 SARSCOV CORONAVIRUS AG IA: CPT | Mod: QW,,, | Performed by: NURSE PRACTITIONER

## 2022-09-28 PROCEDURE — 99213 OFFICE O/P EST LOW 20 MIN: CPT | Mod: ,,, | Performed by: NURSE PRACTITIONER

## 2022-09-28 PROCEDURE — 3008F PR BODY MASS INDEX (BMI) DOCUMENTED: ICD-10-PCS | Mod: ,,, | Performed by: NURSE PRACTITIONER

## 2022-09-28 RX ORDER — PROMETHAZINE HYDROCHLORIDE AND DEXTROMETHORPHAN HYDROBROMIDE 6.25; 15 MG/5ML; MG/5ML
5 SYRUP ORAL EVERY 4 HOURS PRN
Qty: 180 ML | Refills: 0 | Status: SHIPPED | OUTPATIENT
Start: 2022-09-28 | End: 2022-10-08

## 2022-09-28 RX ORDER — ALBUTEROL SULFATE 90 UG/1
2 AEROSOL, METERED RESPIRATORY (INHALATION) EVERY 6 HOURS PRN
Qty: 18 G | Refills: 0 | Status: SHIPPED | OUTPATIENT
Start: 2022-09-28 | End: 2023-09-18

## 2022-09-28 RX ORDER — AMOXICILLIN AND CLAVULANATE POTASSIUM 875; 125 MG/1; MG/1
1 TABLET, FILM COATED ORAL EVERY 12 HOURS
Qty: 20 TABLET | Refills: 0 | Status: SHIPPED | OUTPATIENT
Start: 2022-09-28 | End: 2023-09-18

## 2022-09-28 NOTE — LETTER
September 28, 2022      [facility] [place]  [address]  [place] [address]  Phone: [phone]  Fax: [phone]       Patient: Mervat Godwin   YOB: 1965  Date of Visit: 09/28/2022    To Whom It May Concern:    Kelly Godwin  was at [facility] on 09/28/2022. The patient may return to work/school on 9/29/22  with no restrictions. If you have any questions or concerns, or if I can be of further assistance, please do not hesitate to contact me.    Sincerely,    DINESH Field

## 2022-09-28 NOTE — PROGRESS NOTES
DINESH Field   [facility]  [address]   [place] [address]     PATIENT NAME: Mervat Godwin  : 1965  DATE: 22  MRN: [id number]      Billing Provider: DINESH Field  Level of Service:   Patient PCP Information       Provider PCP Type    DINESH Field General            Reason for Visit / Chief Complaint: Cough, Nasal Congestion (Taking medications as directed. Was positive for COVID the end of July and never got over the cough. Has been sick x 1 1/2 weeks), Wheezing, and sinus pressure       Update PCP  Update Chief Complaint         History of Present Illness / Problem Focused Workflow     Mervat Godwin is a 56 y.o. female presents to the clinic  with one week history of nasal congestion with sinus pressure, cough with little production and wheeze at night.  She has not had  fever.  She has used some albuterol that she had previously with Covid.  She just feels tired/fatigued and bad in general.   She had Decadron shot last   Week that helped 24 hours only.   She has seen ENT in the past      Review of Systems     Review of Systems   Constitutional:  Positive for fatigue.   HENT:  Positive for nasal congestion. Negative for sore throat.    Respiratory:  Positive for cough and wheezing. Negative for chest tightness and shortness of breath.    Cardiovascular:  Negative for chest pain, palpitations and leg swelling.   Gastrointestinal:  Negative for nausea, vomiting and reflux.   Neurological:  Negative for weakness and memory loss.   Psychiatric/Behavioral:  Negative for confusion and sleep disturbance.       Medical / Social / Family History   No past medical history on file.    Past Surgical History:   Procedure Laterality Date    HYSTERECTOMY      OOPHORECTOMY         Social History  Ms.  reports that she has never smoked. She has never used smokeless tobacco. She reports that she does not drink alcohol and does not use drugs.    Family History  Ms.'s family history includes  "Breast cancer in her maternal grandmother.    Medications and Allergies     Medications  Outpatient Medications Marked as Taking for the 9/28/22 encounter (Office Visit) with DINESH Field   Medication Sig Dispense Refill    albuterol (VENTOLIN HFA) 90 mcg/actuation inhaler Inhale 2 puffs into the lungs every 6 (six) hours as needed for Wheezing. Rescue 18 g 0    atorvastatin (LIPITOR) 20 MG tablet Take 1 tablet (20 mg total) by mouth nightly. 90 tablet 3    hydrOXYchloroQUINE (PLAQUENIL) 200 mg tablet Take 200 mg by mouth 2 (two) times daily.         Allergies  Review of patient's allergies indicates:  No Known Allergies    Physical Examination     Vitals:    09/28/22 1513   BP: (!) 148/79   BP Location: Right arm   Patient Position: Sitting   BP Method: Medium (Automatic)   Pulse: 85   Resp: 18   Temp: 98.4 °F (36.9 °C)   TempSrc: Oral   SpO2: 100%   Weight: 89.8 kg (198 lb)   Height: 5' 6" (1.676 m)      Physical Exam  Constitutional:       Appearance: Normal appearance.      Comments: Obviously feels bad in general   HENT:      Right Ear: Tympanic membrane normal.      Left Ear: Tympanic membrane normal.      Nose: Congestion present.      Mouth/Throat:      Mouth: Mucous membranes are moist.      Pharynx: No posterior oropharyngeal erythema.   Cardiovascular:      Rate and Rhythm: Normal rate and regular rhythm.      Pulses: Normal pulses.      Heart sounds: Normal heart sounds.   Pulmonary:      Effort: Pulmonary effort is normal.      Breath sounds: Normal breath sounds. No wheezing or rhonchi.   Abdominal:      Palpations: Abdomen is soft.   Skin:     General: Skin is warm and dry.   Neurological:      Mental Status: She is alert and oriented to person, place, and time.        Assessment and Plan (including Health Maintenance)      Problem List  Smart Sets  Document Outside HM   :    Plan:  rapid covid negative.  Will treat with antibiotics and try promethazine dm for cough/congestion.  Drink lots of " fluids and rest. Augmentin 875mg bid #14  Follow up if not improving.        Health Maintenance Due   Topic Date Due    Hepatitis C Screening  Never done    HIV Screening  Never done    TETANUS VACCINE  Never done    Colorectal Cancer Screening  Never done    Shingles Vaccine (1 of 2) Never done    COVID-19 Vaccine (4 - Booster for Moderna series) 02/04/2022    Mammogram  10/18/2022       Problem List Items Addressed This Visit    None    There are no diagnoses linked to this encounter.   Health Maintenance Topics with due status: Not Due       Topic Last Completion Date    Lipid Panel 11/23/2021       Procedures     No future appointments.     No follow-ups on file.     Signature:  DINESH Field    Date of encounter: 9/28/22

## 2022-10-24 ENCOUNTER — HOSPITAL ENCOUNTER (OUTPATIENT)
Dept: RADIOLOGY | Facility: HOSPITAL | Age: 57
Discharge: HOME | End: 2022-10-24
Attending: OBSTETRICS & GYNECOLOGY
Payer: COMMERCIAL

## 2022-10-24 DIAGNOSIS — Z12.31 BREAST CANCER SCREENING BY MAMMOGRAM: ICD-10-CM

## 2022-10-24 PROCEDURE — 77067 SCR MAMMO BI INCL CAD: CPT | Mod: TC

## 2023-01-02 PROBLEM — J01.00 ACUTE MAXILLARY SINUSITIS: Status: RESOLVED | Noted: 2022-09-28 | Resolved: 2023-01-02

## 2023-01-12 ENCOUNTER — IMMUNIZATION (OUTPATIENT)
Dept: FAMILY MEDICINE | Facility: CLINIC | Age: 58
End: 2023-01-12
Payer: COMMERCIAL

## 2023-01-12 DIAGNOSIS — Z23 NEED FOR VACCINATION: Primary | ICD-10-CM

## 2023-01-12 PROCEDURE — 91313 COVID-19, MRNA, LNP-S, BIVALENT BOOSTER, PF, 50 MCG/0.5 ML: ICD-10-PCS | Mod: ,,, | Performed by: NURSE PRACTITIONER

## 2023-01-12 PROCEDURE — 0134A COVID-19, MRNA, LNP-S, BIVALENT BOOSTER, PF, 50 MCG/0.5 ML: ICD-10-PCS | Mod: ,,, | Performed by: NURSE PRACTITIONER

## 2023-01-12 PROCEDURE — 0134A COVID-19, MRNA, LNP-S, BIVALENT BOOSTER, PF, 50 MCG/0.5 ML: CPT | Mod: ,,, | Performed by: NURSE PRACTITIONER

## 2023-01-12 PROCEDURE — 91313 COVID-19, MRNA, LNP-S, BIVALENT BOOSTER, PF, 50 MCG/0.5 ML: CPT | Mod: ,,, | Performed by: NURSE PRACTITIONER

## 2023-09-18 ENCOUNTER — OFFICE VISIT (OUTPATIENT)
Dept: FAMILY MEDICINE | Facility: CLINIC | Age: 58
End: 2023-09-18
Payer: COMMERCIAL

## 2023-09-18 VITALS
DIASTOLIC BLOOD PRESSURE: 84 MMHG | SYSTOLIC BLOOD PRESSURE: 128 MMHG | BODY MASS INDEX: 32.47 KG/M2 | HEIGHT: 66 IN | HEART RATE: 75 BPM | WEIGHT: 202 LBS | OXYGEN SATURATION: 96 % | RESPIRATION RATE: 20 BRPM | TEMPERATURE: 98 F

## 2023-09-18 DIAGNOSIS — E78.2 MIXED HYPERLIPIDEMIA: Chronic | ICD-10-CM

## 2023-09-18 DIAGNOSIS — Z23 NEED FOR TDAP VACCINATION: ICD-10-CM

## 2023-09-18 DIAGNOSIS — H61.23 BILATERAL IMPACTED CERUMEN: ICD-10-CM

## 2023-09-18 DIAGNOSIS — Z00.01 ENCOUNTER FOR GENERAL ADULT MEDICAL EXAMINATION WITH ABNORMAL FINDINGS: Primary | ICD-10-CM

## 2023-09-18 DIAGNOSIS — Z13.1 SCREENING FOR DIABETES MELLITUS: ICD-10-CM

## 2023-09-18 DIAGNOSIS — Z13.220 SCREENING FOR LIPOID DISORDERS: ICD-10-CM

## 2023-09-18 PROBLEM — R05.9 COUGHING: Status: RESOLVED | Noted: 2022-09-21 | Resolved: 2023-09-18

## 2023-09-18 PROBLEM — J10.1 INFLUENZA B: Status: RESOLVED | Noted: 2022-02-22 | Resolved: 2023-09-18

## 2023-09-18 PROBLEM — U07.1 COVID-19: Status: RESOLVED | Noted: 2022-02-22 | Resolved: 2023-09-18

## 2023-09-18 PROBLEM — J06.9 UPPER RESPIRATORY TRACT INFECTION: Status: RESOLVED | Noted: 2021-12-06 | Resolved: 2023-09-18

## 2023-09-18 PROBLEM — Z11.52 ENCOUNTER FOR SCREENING LABORATORY TESTING FOR SEVERE ACUTE RESPIRATORY SYNDROME CORONAVIRUS 2 (SARS-COV-2): Status: RESOLVED | Noted: 2022-02-22 | Resolved: 2023-09-18

## 2023-09-18 LAB
CHOLEST SERPL-MCNC: 134 MG/DL (ref 0–200)
CHOLEST/HDLC SERPL: 2.1 {RATIO}
GLUCOSE SERPL-MCNC: 77 MG/DL (ref 74–106)
HDLC SERPL-MCNC: 64 MG/DL (ref 40–60)
LDLC SERPL CALC-MCNC: 55 MG/DL
NONHDLC SERPL-MCNC: 70 MG/DL
TRIGL SERPL-MCNC: 76 MG/DL (ref 35–150)
VLDLC SERPL-MCNC: 15 MG/DL

## 2023-09-18 PROCEDURE — 1159F MED LIST DOCD IN RCRD: CPT | Mod: ,,, | Performed by: NURSE PRACTITIONER

## 2023-09-18 PROCEDURE — 82947 ASSAY GLUCOSE BLOOD QUANT: CPT | Mod: ,,, | Performed by: CLINICAL MEDICAL LABORATORY

## 2023-09-18 PROCEDURE — 90471 TDAP VACCINE GREATER THAN OR EQUAL TO 7YO IM: ICD-10-PCS | Mod: ,,, | Performed by: NURSE PRACTITIONER

## 2023-09-18 PROCEDURE — 99396 PREV VISIT EST AGE 40-64: CPT | Mod: 25,,, | Performed by: NURSE PRACTITIONER

## 2023-09-18 PROCEDURE — 3079F PR MOST RECENT DIASTOLIC BLOOD PRESSURE 80-89 MM HG: ICD-10-PCS | Mod: ,,, | Performed by: NURSE PRACTITIONER

## 2023-09-18 PROCEDURE — 80061 LIPID PANEL: ICD-10-PCS | Mod: ,,, | Performed by: CLINICAL MEDICAL LABORATORY

## 2023-09-18 PROCEDURE — 80061 LIPID PANEL: CPT | Mod: ,,, | Performed by: CLINICAL MEDICAL LABORATORY

## 2023-09-18 PROCEDURE — 1159F PR MEDICATION LIST DOCUMENTED IN MEDICAL RECORD: ICD-10-PCS | Mod: ,,, | Performed by: NURSE PRACTITIONER

## 2023-09-18 PROCEDURE — 3008F PR BODY MASS INDEX (BMI) DOCUMENTED: ICD-10-PCS | Mod: ,,, | Performed by: NURSE PRACTITIONER

## 2023-09-18 PROCEDURE — 90715 TDAP VACCINE GREATER THAN OR EQUAL TO 7YO IM: ICD-10-PCS | Mod: ,,, | Performed by: NURSE PRACTITIONER

## 2023-09-18 PROCEDURE — 69209 PR REMOVAL IMPACTED CERUMEN USING IRRIGATION/LAVAGE, UNILATERAL: ICD-10-PCS | Mod: 50,,, | Performed by: NURSE PRACTITIONER

## 2023-09-18 PROCEDURE — 99396 PR PREVENTIVE VISIT,EST,40-64: ICD-10-PCS | Mod: 25,,, | Performed by: NURSE PRACTITIONER

## 2023-09-18 PROCEDURE — 3079F DIAST BP 80-89 MM HG: CPT | Mod: ,,, | Performed by: NURSE PRACTITIONER

## 2023-09-18 PROCEDURE — 3074F SYST BP LT 130 MM HG: CPT | Mod: ,,, | Performed by: NURSE PRACTITIONER

## 2023-09-18 PROCEDURE — 69209 REMOVE IMPACTED EAR WAX UNI: CPT | Mod: 50,,, | Performed by: NURSE PRACTITIONER

## 2023-09-18 PROCEDURE — 90715 TDAP VACCINE 7 YRS/> IM: CPT | Mod: ,,, | Performed by: NURSE PRACTITIONER

## 2023-09-18 PROCEDURE — 3008F BODY MASS INDEX DOCD: CPT | Mod: ,,, | Performed by: NURSE PRACTITIONER

## 2023-09-18 PROCEDURE — 82947 GLUCOSE, FASTING: ICD-10-PCS | Mod: ,,, | Performed by: CLINICAL MEDICAL LABORATORY

## 2023-09-18 PROCEDURE — 90471 IMMUNIZATION ADMIN: CPT | Mod: ,,, | Performed by: NURSE PRACTITIONER

## 2023-09-18 PROCEDURE — 3074F PR MOST RECENT SYSTOLIC BLOOD PRESSURE < 130 MM HG: ICD-10-PCS | Mod: ,,, | Performed by: NURSE PRACTITIONER

## 2023-09-18 RX ORDER — IBUPROFEN 800 MG/1
800 TABLET ORAL
COMMUNITY
Start: 2023-05-01 | End: 2023-09-18

## 2023-09-18 NOTE — PATIENT INSTRUCTIONS
"Patient Education       Diet and Health   The Basics   Written by the doctors and editors at [facility]   Why is it important to eat a healthy diet? -- It's important to eat a healthy diet because eating the right foods can keep you healthy now and later on in life.  Which foods are especially healthy? -- Foods that are especially healthy include:  Fruits and vegetables - Eating a diet with lots of fruits and vegetables can help prevent heart disease and strokes. It might also help prevent certain types of cancers. Try to eat fruits and vegetables at each meal and also for snacks. If you don't have fresh fruits and vegetables available, you can eat frozen or canned ones instead. Doctors recommend eating at least 2 1/2 servings of vegetables and 2 servings of fruits each day.  Foods with fiber - Eating foods with a lot of fiber can help prevent heart disease and strokes. If you have type 2 diabetes, it can also help control your blood sugar. Foods that have a lot of fiber include vegetables, fruits, beans, nuts, oatmeal, and whole grain breads and cereals. You can tell how much fiber is in a food by reading the nutrition label (figure 1). Doctors recommend eating 25 to 36 grams of fiber each day.  Foods with folate (also called folic acid) - Folate is a vitamin that is important for pregnant people, since it helps prevent certain birth defects. Anyone who could get pregnant should get at least 400 micrograms of folic acid daily, whether or not they are actively trying to get pregnant. Folate is found in many breakfast cereals, oranges, orange juice, and green leafy vegetables.  Foods with calcium and vitamin D - Babies, children, and adults need calcium and vitamin D to help keep their bones strong. Adults also need calcium and vitamin D to help prevent osteoporosis. Osteoporosis is a condition that causes bones to get "thin" and break more easily than usual. Different foods and drinks have calcium and vitamin D in " "them (figure 2). People who don't get enough calcium and vitamin D in their diet might need to take a supplement.  Foods with protein - Protein helps your muscles stay strong. Healthy foods with a lot of protein include chicken, fish, eggs, beans, nuts, and soy products. Red meat also has a lot of protein, but it also contains fats, which can be unhealthy.  Some experts recommend a "Mediterranean diet." This involves eating a lot of fruits, vegetables, nuts, whole grains, and olive oil. It also includes some fish, poultry, and dairy products, but not a lot of red meat. Eating this way can help your overall health, and might even lower your risk of having a stroke.  What foods should I avoid or limit? -- To eat a healthy diet, there are some things you should avoid or limit. They include:   Fats - There are different types of fats. Some types of fats are better for your body than others.  Trans fats are especially unhealthy. They are found in margarines, many fast foods, and some store-bought baked goods. Trans fats can raise your cholesterol level and your increase your chance of getting heart disease. You should avoid eating foods with these types of fats.  The type of "polyunsaturated" fats found in fish seems to be healthy and can reduce your chance of getting heart disease. Other polyunsaturated fats might also be good for your health. When you cook, it's best to use oils with healthier fats, such as olive oil and canola oil.  Sugar - To have a healthy diet, it's important to limit or avoid sugar, sweets, and refined grains. Refined grains are found in white bread, white rice, most forms of pasta, and most packaged "snack" foods. Whole grains, such as whole-wheat bread and brown rice, have more fiber and are better for your health.  Avoiding sugar-sweetened beverages, like soda and sports drinks, can also help improve your health.  Red meat - Studies have shown that eating a lot of red meat can increase your " "risk of certain health problems, including heart disease and cancer. You should limit the amount of red meat that you eat.  Can I drink alcohol as part of a healthy diet? -- People who drink a small amount of alcohol each day might have a lower chance of getting heart disease. But drinking alcohol can lead to problems. For example, it can raise a person's chances of getting liver disease and certain types of cancers. In women, even 1 drink a day can increase the risk of getting breast cancer.  Most doctors recommend that adult women not have more than 1 drink a day and that adult men not have more than 2 drinks a day. The limits are different because most women's bodies take longer to break down alcohol.  How many calories do I need each day? -- The number of calories you need each day depends on your weight, height, age, sex, and how active you are.  Your doctor or nurse can tell you how many calories you should eat each day. If you are trying to lose weight, you should eat fewer calories each day.  What if I have questions? -- If you have questions about which foods you should or should not eat, ask your doctor or nurse. The right diet for you will depend, in part, on your health and any medical conditions you have.  All topics are updated as new evidence becomes available and our peer review process is complete.  This topic retrieved from [employer] on: Sep 21, 2021.  Topic 10736 Version 20.0  Release: 29.4.2 - C29.263  © 2021 UpToDate, Inc. and/or its affiliates. All rights reserved.  figure 1: Nutrition label - fiber     This is an example of a nutrition label. To figure out how much fiber is in a food, look for the line that says "Dietary Fiber." It's also important to look at the serving size. This food has 7 grams of fiber in each serving, and each serving is 1 cup.  Graphic 48187 Version 7.0    figure 2: Foods and drinks with calcium and vitamin D     Foods rich in calcium include ice cream, soy milk, breads, " "kale, broccoli, milk, cheese, cottage cheese, almonds, yogurt, ready-to-eat cereals, beans, and tofu. Foods rich in vitamin D include milk, fortified plant-based "milks" (soy, almond), canned tuna fish, cod liver oil, yogurt, ready-to-eat-cereals, cooked salmon, canned sardines, mackerel, and eggs. Some of these foods are rich in both.  Graphic 27392 Version 4.0    Consumer Information Use and Disclaimer   This information is not specific medical advice and does not replace information you receive from your health care provider. This is only a brief summary of general information. It does NOT include all information about conditions, illnesses, injuries, tests, procedures, treatments, therapies, discharge instructions or life-style choices that may apply to you. You must talk with your health care provider for complete information about your health and treatment options. This information should not be used to decide whether or not to accept your health care provider's advice, instructions or recommendations. Only your health care provider has the knowledge and training to provide advice that is right for you. The use of this information is governed by the [employer] End User License Agreement, available at https://www.[employer]/en/solutions/[employer]/about/maria esther.The use of [employer] content is governed by the [employer] Terms of Use. ©2021 UpToDate, Inc. All rights reserved.  Copyright   © 2021 UpToDate, Inc. and/or its affiliates. All rights reserved.    "

## 2023-09-18 NOTE — PROGRESS NOTES
Subjective     Patient ID: Mervat Godwin is a 57 y.o. female.    Chief Complaint: Healthy You (Has her mammogram scheduled at [facility] and pap smear at the [facility], [place], operated by [facility] with Aracely Hernandez NP next month. Pt also mentions that her right ear is stopped up)    HPI    Pt is a 57yoF who presents to clinic today for Healthy You. She is fasting today. Her mammogram([facility])and pap (Aracely Hernandez)  are scheduled next month. Last colonoscopy was 5 years ago at GI clinic in [place]. She is unsure of when she was supposed to followup.   Reports lower back pain that is intermittent. Denies injury. She has been going to the chiropractor with slight relief. She has been having intermittent right thumb pain with repeated movement. Describes pain as a soreness. 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  Also reports her right ear feeling full and like it may be stopped up. States she tried to clean her ears, but it only made them feel worse.  Encouraged pt to get shingles vaccine at the pharmacy. Also encouraged flu and tetanus vaccines.  Discussed self breast exams, states she does monthly.  Reports difficulty falling asleep and staying asleep. Occasionally wakes up to urinate throughout the night.     Review of Systems   Constitutional:  Negative for activity change, fatigue and unexpected weight change.   HENT:  Negative for nasal congestion, ear discharge, ear pain, sneezing and sore throat.    Respiratory:  Negative for cough and shortness of breath.    Gastrointestinal:  Positive for blood in stool. Negative for change in bowel habit, constipation, diarrhea, rectal pain and change in bowel habit.        Occasional bright red blood from hemorrhoids   Musculoskeletal:  Positive for arthralgias and back pain.        Right thumb pain, lower back pain   Integumentary:  Negative for color change, rash and mole/lesion.   Neurological:  Negative for dizziness, numbness,  headaches and memory loss.   Psychiatric/Behavioral:  Positive for sleep disturbance. Negative for confusion and decreased concentration. The patient is not nervous/anxious.        Tobacco Use: Low Risk  (9/18/2023)    Patient History     Smoking Tobacco Use: Never     Smokeless Tobacco Use: Never     Passive Exposure: Never     Review of patient's allergies indicates:  No Known Allergies  Current Outpatient Medications   Medication Instructions    atorvastatin (LIPITOR) 20 mg, Oral, Nightly    hydroxychloroquine (PLAQUENIL) 200 mg, Oral, 2 times daily     Medications Discontinued During This Encounter   Medication Reason    albuterol (VENTOLIN HFA) 90 mcg/actuation inhaler Patient no longer taking    amoxicillin-clavulanate 875-125mg (AUGMENTIN) 875-125 mg per tablet Patient no longer taking    estradioL (ESTRACE) 0.01 % (0.1 mg/gram) vaginal cream Patient no longer taking    fluticasone propionate (FLONASE) 50 mcg/actuation nasal spray Patient no longer taking    ibuprofen (ADVIL,MOTRIN) 800 MG tablet Patient no longer taking    metFORMIN (GLUCOPHAGE) 500 MG tablet Patient no longer taking    metroNIDAZOLE (METROGEL) 0.75 % vaginal gel Patient no longer taking    naltrexone (DEPADE) 50 mg tablet Patient no longer taking    pilocarpine (SALAGEN) 5 MG Tab Patient no longer taking    albuterol (VENTOLIN HFA) 90 mcg/actuation inhaler Patient no longer taking    pilocarpine (SALAGEN) 5 MG Tab Patient no longer taking       Past Medical History:   Diagnosis Date    Hyperlipidemia     Influenza B 2/22/2022    Sjogren's disease      Health Maintenance Topics with due status: Not Due       Topic Last Completion Date    Lipid Panel 11/23/2021     Immunization History   Administered Date(s) Administered    COVID-19 MRNA, LN-S PF (MODERNA HALF 0.25 ML DOSE) 12/10/2021    COVID-19, MRNA, LN-S, PF (MODERNA FULL 0.5 ML DOSE) 03/01/2021, 03/29/2021    COVID-19, mRNA, LNP-S, bivalent booster, PF (Moderna  "Omicron) 01/12/2023    Influenza 10/06/2021       Objective     Body mass index is 32.6 kg/m².  Wt Readings from Last 3 Encounters:   09/18/23 91.6 kg (202 lb)   09/28/22 89.8 kg (198 lb)   07/25/22 91.2 kg (201 lb)     Ht Readings from Last 3 Encounters:   09/18/23 5' 6" (1.676 m)   09/28/22 5' 6" (1.676 m)   09/21/22 5' 6" (1.676 m)     BP Readings from Last 3 Encounters:   09/18/23 128/84   09/28/22 (!) 148/79   09/21/22 129/78     Temp Readings from Last 3 Encounters:   09/18/23 98.3 °F (36.8 °C) (Oral)   09/28/22 98.4 °F (36.9 °C) (Oral)   09/21/22 98.7 °F (37.1 °C) (Oral)     Pulse Readings from Last 3 Encounters:   09/18/23 75   09/28/22 85   09/21/22 95     Resp Readings from Last 3 Encounters:   09/18/23 20   09/28/22 18   09/21/22 20     PF Readings from Last 3 Encounters:   No data found for PF       Physical Exam  Constitutional:       Appearance: She is obese.   HENT:      Head: Normocephalic.      Comments: Bilateral EAC's irrigated with warm water and vinegar  and cerumen removed without difficulty per nurse.  Pt tolerated procedure well with no complications/tinamabry fnp     Right Ear: There is impacted cerumen.      Left Ear: There is impacted cerumen.      Nose: Nose normal.      Mouth/Throat:      Mouth: Mucous membranes are dry.   Eyes:      Extraocular Movements: Extraocular movements intact.      Pupils: Pupils are equal, round, and reactive to light.   Cardiovascular:      Rate and Rhythm: Normal rate and regular rhythm.      Pulses: Normal pulses.      Heart sounds: Normal heart sounds.   Pulmonary:      Effort: Pulmonary effort is normal.      Breath sounds: Normal breath sounds.   Abdominal:      General: Abdomen is flat.      Palpations: Abdomen is soft.   Musculoskeletal:         General: Normal range of motion.      Cervical back: Normal range of motion and neck supple.   Skin:     General: Skin is warm and dry.   Neurological:      General: No focal deficit present.      Mental " Status: She is alert and oriented to person, place, and time.   Psychiatric:         Mood and Affect: Mood normal.         Behavior: Behavior normal.         Thought Content: Thought content normal.         Judgment: Judgment normal.     Assessment and Plan     Problem List Items Addressed This Visit    None  Visit Diagnoses       Screening for diabetes mellitus    -  Primary    Relevant Orders    Glucose, Fasting    Screening for lipoid disorders        Relevant Orders    Lipid Panel              Plan:    will check labs today and notify of results. Encouarge  regular exercise and stretching to help with  back pain, discussed  sleep hygiene and may try tylenol pm if needed.   Encouraged flu and covid vaccine updates.        I have reviewed the medications, allergies, and problem list.     Goal Actions:    What type of visit is the patient here for today?: Healthy You  Does the patient consent to enroll in [facility] Healthy?: Yes  Is this a Wellness Follow Up?: No  What is your overall wellness goal? (select at least one): Improve overall health  Choose 3: Lifestyle, Nutrition, Exercise  Lifestyle Actions : Develop good support system  Nurtrition Actions: Eat heart healthy diet, Read nutrition labels, drink 8-10 glasses of water per day  Exercise Actions: Recommend physical activity 30 minutes per day 3-5 times/week

## 2023-09-19 ENCOUNTER — TELEPHONE (OUTPATIENT)
Dept: FAMILY MEDICINE | Facility: CLINIC | Age: 58
End: 2023-09-19
Payer: COMMERCIAL

## 2023-09-19 ENCOUNTER — PATIENT OUTREACH (OUTPATIENT)
Dept: ADMINISTRATIVE | Facility: HOSPITAL | Age: 58
End: 2023-09-19

## 2023-09-19 NOTE — TELEPHONE ENCOUNTER
----- Message from Alexandra Villasenor LPN sent at 9/19/2023 11:28 AM CDT -----  Regarding: needs a1c for HY compliance  Please ask pt to return to the clinic for A!C due to  did not draw a purple top for 9/18/23 healthy you compliance.  Sandra berger per secure message . Thanks, Alexandra

## 2023-09-19 NOTE — PROGRESS NOTES
Post visit Population Health review of encounter with date of service  9/18/23 with [facility].  All required HY components in encounter except a1c and lab did not draw a purple top message sent to provider's staff to get pt to rtc for a1c for healthy you compliance. Added myself to julio Morris   Followup appt for:  3/13/24 HY

## 2023-09-19 NOTE — TELEPHONE ENCOUNTER
No answer at the number listed and left a voice message for the pt to return tomorrow to have the lab drawn for the A1c for her wellness visit that was 9/18/2023; otherwise she may be charged for that visit.

## 2023-09-20 ENCOUNTER — TELEPHONE (OUTPATIENT)
Dept: FAMILY MEDICINE | Facility: CLINIC | Age: 58
End: 2023-09-20
Payer: COMMERCIAL

## 2023-09-21 ENCOUNTER — PATIENT OUTREACH (OUTPATIENT)
Dept: ADMINISTRATIVE | Facility: HOSPITAL | Age: 58
End: 2023-09-21

## 2023-09-21 NOTE — PROGRESS NOTES
Reviewed measures for population health  [facility], [facility], and [facility] has been reviewed.   Uploaded pt colonoscopy report from [facility]. Repeat in 10 years

## 2023-09-25 ENCOUNTER — CLINICAL SUPPORT (OUTPATIENT)
Dept: FAMILY MEDICINE | Facility: CLINIC | Age: 58
End: 2023-09-25
Payer: COMMERCIAL

## 2023-09-25 DIAGNOSIS — T80.89XA: Primary | ICD-10-CM

## 2023-09-25 DIAGNOSIS — L02.91: Primary | ICD-10-CM

## 2023-09-25 PROCEDURE — 99212 OFFICE O/P EST SF 10 MIN: CPT | Mod: ,,, | Performed by: NURSE PRACTITIONER

## 2023-09-25 PROCEDURE — 99212 PR OFFICE/OUTPT VISIT, EST, LEVL II, 10-19 MIN: ICD-10-PCS | Mod: ,,, | Performed by: NURSE PRACTITIONER

## 2023-09-25 NOTE — PROGRESS NOTES
Pt returned concerned about swelling and soreness at tetanus injection site last week.   ROS otherwise normal.   Exam:  approx 1cm hematoma/sterile abscess that is warm and  tender to touch noted left upper arm.   Advised to apply warm compresses to area multiple times daily and if not improving or worsens, to follow up in clinic./tm

## 2023-10-30 ENCOUNTER — HOSPITAL ENCOUNTER (OUTPATIENT)
Dept: RADIOLOGY | Facility: HOSPITAL | Age: 58
Discharge: HOME | End: 2023-10-30
Payer: COMMERCIAL

## 2023-10-30 VITALS — HEIGHT: 66 IN | WEIGHT: 204 LBS | BODY MASS INDEX: 32.78 KG/M2

## 2023-10-30 DIAGNOSIS — Z12.31 BREAST CANCER SCREENING BY MAMMOGRAM: ICD-10-CM

## 2023-10-30 PROCEDURE — 77067 SCR MAMMO BI INCL CAD: CPT | Mod: TC

## 2023-12-05 DIAGNOSIS — E78.5 HYPERLIPIDEMIA, UNSPECIFIED HYPERLIPIDEMIA TYPE: ICD-10-CM

## 2023-12-05 RX ORDER — NAPROXEN 500 MG/1
500 TABLET ORAL EVERY 12 HOURS PRN
COMMUNITY
Start: 2023-11-30

## 2023-12-05 RX ORDER — ATORVASTATIN CALCIUM 20 MG/1
20 TABLET, FILM COATED ORAL NIGHTLY
Qty: 90 TABLET | Refills: 3 | Status: SHIPPED | OUTPATIENT
Start: 2023-12-05

## 2023-12-05 RX ORDER — MELOXICAM 7.5 MG/1
7.5 TABLET ORAL DAILY PRN
COMMUNITY
Start: 2023-11-17

## 2023-12-05 NOTE — TELEPHONE ENCOUNTER
----- Message from Keerthi Hernadez sent at 12/4/2023  1:59 PM CST -----  Regarding: refill  Pt needs a refill on atorvastatin sent to [facility]. [phone]

## 2023-12-05 NOTE — TELEPHONE ENCOUNTER
Last OV - 9/18/23  Last labs - 9/18/23    Result note: Please notify  Mervat that her cholesterol is excellent and blood sugar was normal/tm

## 2023-12-18 PROBLEM — Z13.1 SCREENING FOR DIABETES MELLITUS: Status: RESOLVED | Noted: 2022-02-22 | Resolved: 2023-12-18

## 2024-05-03 ENCOUNTER — OFFICE VISIT (OUTPATIENT)
Dept: FAMILY MEDICINE | Facility: CLINIC | Age: 59
End: 2024-05-03
Payer: COMMERCIAL

## 2024-05-03 VITALS
HEIGHT: 66 IN | BODY MASS INDEX: 33.01 KG/M2 | DIASTOLIC BLOOD PRESSURE: 79 MMHG | WEIGHT: 205.38 LBS | HEART RATE: 76 BPM | OXYGEN SATURATION: 97 % | SYSTOLIC BLOOD PRESSURE: 121 MMHG | RESPIRATION RATE: 18 BRPM | TEMPERATURE: 98 F

## 2024-05-03 DIAGNOSIS — N76.0 BACTERIAL VAGINOSIS: ICD-10-CM

## 2024-05-03 DIAGNOSIS — M35.01 SJOGREN'S SYNDROME WITH KERATOCONJUNCTIVITIS SICCA: Chronic | ICD-10-CM

## 2024-05-03 DIAGNOSIS — Z11.59 NEED FOR HEPATITIS C SCREENING TEST: ICD-10-CM

## 2024-05-03 DIAGNOSIS — Z13.220 SCREENING FOR LIPID DISORDERS: ICD-10-CM

## 2024-05-03 DIAGNOSIS — Z13.1 SCREENING FOR DIABETES MELLITUS: ICD-10-CM

## 2024-05-03 DIAGNOSIS — Z00.01 ENCOUNTER FOR GENERAL ADULT MEDICAL EXAMINATION WITH ABNORMAL FINDINGS: Primary | ICD-10-CM

## 2024-05-03 DIAGNOSIS — G89.29 CHRONIC RIGHT-SIDED LOW BACK PAIN WITH RIGHT-SIDED SCIATICA: Chronic | ICD-10-CM

## 2024-05-03 DIAGNOSIS — E55.9 VITAMIN D DEFICIENCY: Chronic | ICD-10-CM

## 2024-05-03 DIAGNOSIS — B96.89 BACTERIAL VAGINOSIS: ICD-10-CM

## 2024-05-03 DIAGNOSIS — R53.83 OTHER FATIGUE: Chronic | ICD-10-CM

## 2024-05-03 DIAGNOSIS — M54.41 CHRONIC RIGHT-SIDED LOW BACK PAIN WITH RIGHT-SIDED SCIATICA: Chronic | ICD-10-CM

## 2024-05-03 DIAGNOSIS — Z79.899 LONG-TERM USE OF HIGH-RISK MEDICATION: ICD-10-CM

## 2024-05-03 LAB
25(OH)D3 SERPL-MCNC: 36.2 NG/ML
ALBUMIN SERPL BCP-MCNC: 3.9 G/DL (ref 3.5–5)
ALBUMIN/GLOB SERPL: 1 {RATIO}
ALP SERPL-CCNC: 101 U/L (ref 46–118)
ALT SERPL W P-5'-P-CCNC: 28 U/L (ref 13–56)
ANION GAP SERPL CALCULATED.3IONS-SCNC: 10 MMOL/L (ref 7–16)
AST SERPL W P-5'-P-CCNC: 29 U/L (ref 15–37)
BASOPHILS # BLD AUTO: 0.04 K/UL (ref 0–0.2)
BASOPHILS NFR BLD AUTO: 1.8 % (ref 0–1)
BILIRUB SERPL-MCNC: 0.4 MG/DL (ref ?–1.2)
BUN SERPL-MCNC: 8 MG/DL (ref 7–18)
BUN/CREAT SERPL: 12 (ref 6–20)
CALCIUM SERPL-MCNC: 9.7 MG/DL (ref 8.5–10.1)
CHLORIDE SERPL-SCNC: 109 MMOL/L (ref 98–107)
CHOLEST SERPL-MCNC: 150 MG/DL (ref 0–200)
CHOLEST/HDLC SERPL: 2.5 {RATIO}
CO2 SERPL-SCNC: 27 MMOL/L (ref 21–32)
CREAT SERPL-MCNC: 0.65 MG/DL (ref 0.55–1.02)
DIFFERENTIAL METHOD BLD: ABNORMAL
EGFR (NO RACE VARIABLE) (RUSH/TITUS): 103 ML/MIN/1.73M2
EOSINOPHIL # BLD AUTO: 0.08 K/UL (ref 0–0.5)
EOSINOPHIL NFR BLD AUTO: 3.6 % (ref 1–4)
EOSINOPHIL NFR BLD MANUAL: 2 % (ref 1–4)
ERYTHROCYTE [DISTWIDTH] IN BLOOD BY AUTOMATED COUNT: 13.4 % (ref 11.5–14.5)
EST. AVERAGE GLUCOSE BLD GHB EST-MCNC: 126 MG/DL
GLOBULIN SER-MCNC: 4 G/DL (ref 2–4)
GLUCOSE SERPL-MCNC: 92 MG/DL (ref 74–106)
HBA1C MFR BLD HPLC: 6 % (ref 4.5–6.6)
HCT VFR BLD AUTO: 44.5 % (ref 38–47)
HCV AB SER QL: NORMAL
HDLC SERPL-MCNC: 60 MG/DL (ref 40–60)
HGB BLD-MCNC: 13.7 G/DL (ref 12–16)
IMM GRANULOCYTES # BLD AUTO: 0 K/UL (ref 0–0.04)
IMM GRANULOCYTES NFR BLD: 0 % (ref 0–0.4)
LDLC SERPL CALC-MCNC: 79 MG/DL
LDLC/HDLC SERPL: 1.3 {RATIO}
LYMPHOCYTES # BLD AUTO: 0.84 K/UL (ref 1–4.8)
LYMPHOCYTES NFR BLD AUTO: 37.3 % (ref 27–41)
LYMPHOCYTES NFR BLD MANUAL: 42 % (ref 27–41)
MCH RBC QN AUTO: 25.7 PG (ref 27–31)
MCHC RBC AUTO-ENTMCNC: 30.8 G/DL (ref 32–36)
MCV RBC AUTO: 83.3 FL (ref 80–96)
MONOCYTES # BLD AUTO: 0.25 K/UL (ref 0–0.8)
MONOCYTES NFR BLD AUTO: 11.1 % (ref 2–6)
MONOCYTES NFR BLD MANUAL: 12 % (ref 2–6)
MPC BLD CALC-MCNC: 10.5 FL (ref 9.4–12.4)
NEUTROPHILS # BLD AUTO: 1.04 K/UL (ref 1.8–7.7)
NEUTROPHILS NFR BLD AUTO: 46.2 % (ref 53–65)
NEUTS SEG NFR BLD MANUAL: 44 % (ref 50–62)
NONHDLC SERPL-MCNC: 90 MG/DL
NRBC # BLD AUTO: 0 X10E3/UL
NRBC, AUTO (.00): 0 %
OVALOCYTES BLD QL SMEAR: ABNORMAL
PLATELET # BLD AUTO: 236 K/UL (ref 150–400)
PLATELET MORPHOLOGY: ABNORMAL
POTASSIUM SERPL-SCNC: 3.9 MMOL/L (ref 3.5–5.1)
PROT SERPL-MCNC: 7.9 G/DL (ref 6.4–8.2)
RBC # BLD AUTO: 5.34 M/UL (ref 4.2–5.4)
SODIUM SERPL-SCNC: 142 MMOL/L (ref 136–145)
TRIGL SERPL-MCNC: 57 MG/DL (ref 35–150)
VIT B12 SERPL-MCNC: 402 PG/ML (ref 193–986)
VLDLC SERPL-MCNC: 11 MG/DL
WBC # BLD AUTO: 2.25 K/UL (ref 4.5–11)

## 2024-05-03 PROCEDURE — 80061 LIPID PANEL: CPT | Mod: ,,, | Performed by: CLINICAL MEDICAL LABORATORY

## 2024-05-03 PROCEDURE — 1159F MED LIST DOCD IN RCRD: CPT | Mod: CPTII,,, | Performed by: NURSE PRACTITIONER

## 2024-05-03 PROCEDURE — 82607 VITAMIN B-12: CPT | Mod: ,,, | Performed by: CLINICAL MEDICAL LABORATORY

## 2024-05-03 PROCEDURE — 36415 COLL VENOUS BLD VENIPUNCTURE: CPT | Performed by: NURSE PRACTITIONER

## 2024-05-03 PROCEDURE — 85025 COMPLETE CBC W/AUTO DIFF WBC: CPT | Mod: ,,, | Performed by: CLINICAL MEDICAL LABORATORY

## 2024-05-03 PROCEDURE — 3074F SYST BP LT 130 MM HG: CPT | Mod: CPTII,,, | Performed by: NURSE PRACTITIONER

## 2024-05-03 PROCEDURE — 3078F DIAST BP <80 MM HG: CPT | Mod: CPTII,,, | Performed by: NURSE PRACTITIONER

## 2024-05-03 PROCEDURE — 99396 PREV VISIT EST AGE 40-64: CPT | Mod: ,,, | Performed by: NURSE PRACTITIONER

## 2024-05-03 PROCEDURE — 83036 HEMOGLOBIN GLYCOSYLATED A1C: CPT | Mod: ,,, | Performed by: CLINICAL MEDICAL LABORATORY

## 2024-05-03 PROCEDURE — 3008F BODY MASS INDEX DOCD: CPT | Mod: CPTII,,, | Performed by: NURSE PRACTITIONER

## 2024-05-03 PROCEDURE — 86803 HEPATITIS C AB TEST: CPT | Mod: ,,, | Performed by: CLINICAL MEDICAL LABORATORY

## 2024-05-03 PROCEDURE — 80053 COMPREHEN METABOLIC PANEL: CPT | Mod: ,,, | Performed by: CLINICAL MEDICAL LABORATORY

## 2024-05-03 PROCEDURE — 82306 VITAMIN D 25 HYDROXY: CPT | Mod: ,,, | Performed by: CLINICAL MEDICAL LABORATORY

## 2024-05-03 RX ORDER — GABAPENTIN 100 MG/1
100 CAPSULE ORAL 2 TIMES DAILY
Qty: 60 CAPSULE | Refills: 11 | Status: SHIPPED | OUTPATIENT
Start: 2024-05-03 | End: 2025-05-03

## 2024-05-03 RX ORDER — MULTIVITAMIN
1 TABLET ORAL DAILY
COMMUNITY

## 2024-05-03 RX ORDER — DIFLUPREDNATE OPHTHALMIC 0.5 MG/ML
1 EMULSION OPHTHALMIC 2 TIMES DAILY
COMMUNITY
Start: 2024-04-18

## 2024-05-03 RX ORDER — CLINDAMYCIN PHOSPHATE 20 MG/G
CREAM VAGINAL NIGHTLY
Qty: 40 G | Status: SHIPPED | OUTPATIENT
Start: 2024-05-03 | End: 2024-06-10 | Stop reason: ALTCHOICE

## 2024-05-03 RX ORDER — PREDNISOLONE ACETATE 10 MG/ML
1 SUSPENSION/ DROPS OPHTHALMIC 4 TIMES DAILY
COMMUNITY
Start: 2024-03-28

## 2024-05-03 RX ORDER — METHYLPREDNISOLONE 4 MG/1
4 TABLET ORAL
COMMUNITY
End: 2024-05-03

## 2024-05-03 RX ORDER — METRONIDAZOLE 500 MG/1
500 TABLET ORAL 2 TIMES DAILY
COMMUNITY
Start: 2024-04-20 | End: 2024-05-03

## 2024-05-03 RX ORDER — ASCORBIC ACID 500 MG
500 TABLET ORAL DAILY
COMMUNITY

## 2024-05-03 RX ORDER — CHOLECALCIFEROL (VITAMIN D3) 25 MCG
TABLET ORAL
COMMUNITY

## 2024-05-03 NOTE — PROGRESS NOTES
"   DINESH Field   [facility]  [address]   [place] [address]     PATIENT NAME: Mervat Godwin  : 1965  DATE: 5/3/24  MRN: [id number]      Billing Provider: DINESH Field  Level of Service:   Patient PCP Information       Provider PCP Type    DINESH Field General            Reason for Visit / Chief Complaint: Ambetter WEllness and Vaginal Discharge (Vaginal discharge and odor that pt describes as a "fishy" odor. /Pt has yearly papsmear scheduled with Aracely Hernandez NP 10/2024)         History of Present Illness / Problem Focused Workflow     Mervat Godwin is a 58 y.o. female presents to the clinic  for ambetter wellness exam.  She has mammogram scheduled at [facility] and has pap smear scheduled with Aracely Hernandez NP in near future.    She has history of hyperlipidemia  and is on a statin.  She has Sjogren's syndrome and needs referral to new rheumatologist; she is on plaquenil and will need refill, she  does not have any    side effects.  States she does not have any joint pain, rashes but her  primary complaint is dry eyes.  She has seen Dr Peyman King and is now using eye drops which are helping.   She recently had flagyl treatment  x 7 days for BV.  She is not sexually active and has had hysterectomy.  Pt has chronic lower back pain and recently she has had right lower back pain radiating down her right leg. She has tried  Chiropractic therapy and is doing stretches; she has tried tens and gets little relief and has  had pain treatment with little success. She is not interested  in referral back to pain treatment.   She has not tried gabapentin.  She is no longer able  to work in the yard or mow  grass without increasing back pain.   She is having difficutly sleeping, states she will doze off  at times--problem is more being able to get to sleep.  Feels  tired all the time. She has tried  multiple  methods for relaxation, tried melatonin  without  success.  Low energy.      Review " of Systems     Review of Systems   Constitutional:  Positive for fatigue.   HENT:  Negative for nasal congestion and sore throat.    Respiratory:  Negative for cough, chest tightness and shortness of breath.    Cardiovascular:  Negative for chest pain, palpitations and leg swelling.   Gastrointestinal:  Negative for nausea, vomiting and reflux.        Hemorrhoids   Genitourinary:  Positive for vaginal discharge.   Neurological:  Negative for weakness and memory loss.   Psychiatric/Behavioral:  Positive for sleep disturbance. Negative for confusion.         Medical / Social / Family History     Past Medical History:   Diagnosis Date    Hyperlipidemia     Influenza B 2/22/2022    Sjogren's disease        Past Surgical History:   Procedure Laterality Date    HYSTERECTOMY      OOPHORECTOMY         Social History  Ms.  reports that she has never smoked. She has never been exposed to tobacco smoke. She has never used smokeless tobacco. She reports that she does not drink alcohol and does not use drugs.    Family History  Ms.'s family history includes Breast cancer in her maternal grandmother.    Medications and Allergies     Medications  Current Outpatient Medications   Medication Sig Dispense Refill    ascorbic acid, vitamin C, (VITAMIN C) 500 MG tablet Take 500 mg by mouth once daily.      atorvastatin (LIPITOR) 20 MG tablet Take 1 tablet (20 mg total) by mouth nightly. 90 tablet 3    difluprednate (DUREZOL) 0.05 % Drop ophthalmic solution Place 1 drop into both eyes 2 (two) times daily.      hydrOXYchloroQUINE (PLAQUENIL) 200 mg tablet Take 200 mg by mouth 2 (two) times daily.      metroNIDAZOLE (FLAGYL) 500 MG tablet Take 500 mg by mouth 2 (two) times daily.      multivitamin (ONE DAILY MULTIVITAMIN) per tablet Take 1 tablet by mouth once daily.      prednisoLONE acetate (PRED FORTE) 1 % DrpS Place 1 drop into both eyes 4 (four) times daily.      vitamin D (VITAMIN D3) 1000 units Tab 1 tablet Orally Once a day       "meloxicam (MOBIC) 7.5 MG tablet Take 7.5 mg by mouth daily as needed.      methylPREDNISolone (MEDROL DOSEPACK) 4 mg tablet Take 4 mg by mouth.      naproxen (NAPROSYN) 500 MG tablet Take 500 mg by mouth every 12 (twelve) hours as needed.       No current facility-administered medications for this visit.       Allergies  Review of patient's allergies indicates:  No Known Allergies    Physical Examination     Vitals:    05/03/24 0826   BP: 121/79   Pulse: 76   Resp: 18   Temp: 97.9 °F (36.6 °C)   TempSrc: Oral   SpO2: 97%   Weight: 93.2 kg (205 lb 6.4 oz)   Height: 5' 6" (1.676 m)      Physical Exam  Constitutional:       Appearance: Normal appearance.   HENT:      Right Ear: Tympanic membrane, ear canal and external ear normal.      Left Ear: Tympanic membrane, ear canal and external ear normal.      Mouth/Throat:      Mouth: Mucous membranes are moist.   Eyes:      Extraocular Movements: Extraocular movements intact.   Cardiovascular:      Rate and Rhythm: Regular rhythm. Bradycardia present.      Heart sounds: Normal heart sounds.   Pulmonary:      Effort: Pulmonary effort is normal.      Breath sounds: Normal breath sounds.   Musculoskeletal:      Cervical back: Neck supple.      Right lower leg: No edema.      Left lower leg: No edema.   Lymphadenopathy:      Cervical: No cervical adenopathy.   Skin:     General: Skin is warm and dry.   Neurological:      Mental Status: She is alert and oriented to person, place, and time.   Psychiatric:         Mood and Affect: Mood normal.         Behavior: Behavior normal.          Assessment and Plan (including Health Maintenance)     :    Plan:  will try Gabapentin 100mg bid, start at night only and increase to twice daily if needed for sleep and chronic back pain,   will Rx clindamycin gel for BV,  check labs and treat as needed, refill plaquenil when needed  and refer to rheumatology,.        Health Maintenance Due   Topic Date Due    Hepatitis C Screening  Never done    " HIV Screening  Never done    Shingles Vaccine (1 of 2) Never done    COVID-19 Vaccine (5 - 2023-24 season) 09/01/2023       Problem List Items Addressed This Visit    None  .  There are no diagnoses linked to this encounter.   Health Maintenance Topics with due status: Not Due       Topic Last Completion Date    Colorectal Cancer Screening 01/15/2018    Influenza Vaccine 09/21/2022    TETANUS VACCINE 09/18/2023    Lipid Panel 09/18/2023    Hemoglobin A1c (Diabetic Prevention Screening) 09/20/2023    Mammogram 10/30/2023       Procedures     Future Appointments   Date Time Provider Department Center   11/6/2024  1:00 PM [facility]   5/8/2025  9:00 [facility]        No follow-ups on file.     Signature:  DINESH Field    Date of encounter: 5/3/24

## 2024-05-06 NOTE — PROGRESS NOTES
Please notify Mervat that her electrolytes, kidney and liver tests all normal.  White blood count remains low but remainder of her blood count is normal.  Has she been told this in the past or had worked up? If not, need to refer to hematologist.  Hep C antibody is negative.  B12 level is marginally low and recommend she take B12 1000mcg once daily OTC. Cholesterol is good.  A1c  shows prediabetes with average glucose 126.  Vitamin D is 36 and sufficient./tm

## 2024-05-06 NOTE — PROGRESS NOTES
Notified pt of lab results and instructions. Pt RICARDA. 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 He told her at her last visit with him that she does not need to see him again.  Walk in

## 2024-05-09 ENCOUNTER — TELEPHONE (OUTPATIENT)
Dept: FAMILY MEDICINE | Facility: CLINIC | Age: 59
End: 2024-05-09
Payer: COMMERCIAL

## 2024-05-09 DIAGNOSIS — M35.01 SJOGREN'S SYNDROME WITH KERATOCONJUNCTIVITIS SICCA: Primary | ICD-10-CM

## 2024-05-13 ENCOUNTER — TELEPHONE (OUTPATIENT)
Dept: FAMILY MEDICINE | Facility: CLINIC | Age: 59
End: 2024-05-13
Payer: COMMERCIAL

## 2024-05-13 NOTE — TELEPHONE ENCOUNTER
Can you ask if there are any rheumatologists at [facility] who are accepting new patients for Sjogren's syndrome?  That may be easier than just continuing to pick names one at a time. Thanks.  Terra Larson

## 2024-05-13 NOTE — TELEPHONE ENCOUNTER
----- Message from Lillie Rodriguez sent at 5/13/2024  2:04 PM CDT -----  Regarding: Rheumatology Referral  Lucy Echols is not taking any new patients, She is only taking patients that are referred by [facility] physicians only.

## 2024-05-14 NOTE — TELEPHONE ENCOUNTER
----- Message from Lillie Rodriguez sent at 5/14/2024 10:08 AM CDT -----  Regarding: Rheumatology Referral  [facility] Department of Rheumatology is not taking any external refers at this time.  Patient needs to be referred elsewhere .

## 2024-06-10 ENCOUNTER — OFFICE VISIT (OUTPATIENT)
Dept: FAMILY MEDICINE | Facility: CLINIC | Age: 59
End: 2024-06-10
Payer: COMMERCIAL

## 2024-06-10 VITALS
HEIGHT: 66 IN | HEART RATE: 84 BPM | OXYGEN SATURATION: 96 % | SYSTOLIC BLOOD PRESSURE: 122 MMHG | RESPIRATION RATE: 18 BRPM | WEIGHT: 207.81 LBS | BODY MASS INDEX: 33.4 KG/M2 | DIASTOLIC BLOOD PRESSURE: 77 MMHG | TEMPERATURE: 98 F

## 2024-06-10 DIAGNOSIS — M35.01 SJOGREN'S SYNDROME WITH KERATOCONJUNCTIVITIS SICCA: Chronic | ICD-10-CM

## 2024-06-10 DIAGNOSIS — R73.03 PREDIABETES: Primary | Chronic | ICD-10-CM

## 2024-06-10 DIAGNOSIS — M54.16 LUMBAR BACK PAIN WITH RADICULOPATHY AFFECTING RIGHT LOWER EXTREMITY: ICD-10-CM

## 2024-06-10 DIAGNOSIS — B96.89 BACTERIAL VAGINOSIS: ICD-10-CM

## 2024-06-10 DIAGNOSIS — E78.2 MIXED HYPERLIPIDEMIA: Chronic | ICD-10-CM

## 2024-06-10 DIAGNOSIS — N76.0 BACTERIAL VAGINOSIS: ICD-10-CM

## 2024-06-10 PROBLEM — Z23 NEED FOR TDAP VACCINATION: Status: RESOLVED | Noted: 2023-09-18 | Resolved: 2024-06-10

## 2024-06-10 PROBLEM — L02.91: Status: RESOLVED | Noted: 2023-09-25 | Resolved: 2024-06-10

## 2024-06-10 PROBLEM — H61.23 BILATERAL IMPACTED CERUMEN: Status: RESOLVED | Noted: 2023-09-18 | Resolved: 2024-06-10

## 2024-06-10 PROBLEM — T80.89XA: Status: RESOLVED | Noted: 2023-09-25 | Resolved: 2024-06-10

## 2024-06-10 PROCEDURE — 3044F HG A1C LEVEL LT 7.0%: CPT | Mod: CPTII,,, | Performed by: NURSE PRACTITIONER

## 2024-06-10 PROCEDURE — 3008F BODY MASS INDEX DOCD: CPT | Mod: CPTII,,, | Performed by: NURSE PRACTITIONER

## 2024-06-10 PROCEDURE — 1159F MED LIST DOCD IN RCRD: CPT | Mod: CPTII,,, | Performed by: NURSE PRACTITIONER

## 2024-06-10 PROCEDURE — 3078F DIAST BP <80 MM HG: CPT | Mod: CPTII,,, | Performed by: NURSE PRACTITIONER

## 2024-06-10 PROCEDURE — 3074F SYST BP LT 130 MM HG: CPT | Mod: CPTII,,, | Performed by: NURSE PRACTITIONER

## 2024-06-10 PROCEDURE — 99214 OFFICE O/P EST MOD 30 MIN: CPT | Mod: ,,, | Performed by: NURSE PRACTITIONER

## 2024-06-10 RX ORDER — METRONIDAZOLE 500 MG/1
500 TABLET ORAL EVERY 12 HOURS
COMMUNITY
Start: 2024-04-20 | End: 2024-06-10

## 2024-06-10 RX ORDER — SEMAGLUTIDE 0.68 MG/ML
0.25 INJECTION, SOLUTION SUBCUTANEOUS
Qty: 3 ML | Refills: 0 | Status: SHIPPED | OUTPATIENT
Start: 2024-06-10

## 2024-06-10 RX ORDER — METRONIDAZOLE 7.5 MG/G
1 GEL VAGINAL 2 TIMES DAILY
Qty: 70 G | Refills: 3 | Status: SHIPPED | OUTPATIENT
Start: 2024-06-10

## 2024-06-10 RX ORDER — NAPROXEN 500 MG/1
500 TABLET ORAL
COMMUNITY
Start: 2023-11-30 | End: 2024-06-10

## 2024-06-10 RX ORDER — METHYLPREDNISOLONE 4 MG/1
4 TABLET ORAL
COMMUNITY
End: 2024-06-10

## 2024-06-10 RX ORDER — CLINDAMYCIN PHOSPHATE 20 MG/G
CREAM VAGINAL NIGHTLY
Qty: 40 G | Status: CANCELLED | OUTPATIENT
Start: 2024-06-10

## 2024-06-10 RX ORDER — LANOLIN ALCOHOL/MO/W.PET/CERES
100 CREAM (GRAM) TOPICAL DAILY
COMMUNITY

## 2024-06-10 RX ORDER — MELOXICAM 7.5 MG/1
7.5 TABLET ORAL DAILY PRN
COMMUNITY
End: 2024-06-10

## 2024-06-10 NOTE — PATIENT INSTRUCTIONS
will submit Rx Ozempic for prediabets and see if this will be approved  Will set up plain film xrays at [facility] and then order physical  therapy at [facility]--pt works in [place] and this will be more convenient  Rx Flagyl cream to insert one applicator twice daily for a week, then refill and insert  twice weekly x 4 weeks to see if can eradiacate BV.  Discussed  cautions and contraindications of GLP1 such as MENs, medullary thyroid cancer, and need to slowly increase dose as tolerated.

## 2024-06-10 NOTE — PROGRESS NOTES
DINESH Field   [facility]  [address]   [place] [address]     PATIENT NAME: Mervat Godwin  : 1965  DATE: 6/10/24  MRN: [id number]      Billing Provider: DINESH Field  Level of Service:   Patient PCP Information       Provider PCP Type    DINESH Field General            Reason for Visit / Chief Complaint: Back Pain (Lower Right back pain radiating to Right leg is worsening.) and prediabetes (Last visit pt was told she was prediabetic and she would like to discuss this further. On 2024 dly glucose was 126 and A1c was 6)         History of Present Illness / Problem Focused Workflow     Mervat Godwin is a 58 y.o. female presents to the clinic  with complaint  of  recurrent bacterial vaginosis. She was treated  with clindamycin vag cream at last visit and cleared for a time.  She is taking probiotics.  PT was told she was prediabetic  at last visit  with A1c of 6.0.  pt is trying to watch her diet and has eliminated fried foods,  rarely eats sweets or sugary drinks.    She has been unable to exercise due to chronic knee pain.     Pt is having lower back pain and has tried taking gabapentin on a prn basis and her back has become more problematic with constant pain radiating down right leg with no numbness or tingling.  She has been stretching and has gone  to Chiropractor with no significant improvement in pain.   Last xrays done approx 10 years ago with bulging  discs and has injections in her back in the past with temporary relief.  She would like to get back in PT and try meds.     She is currently  working in [place] at  [employer] and does not have any problems with this job.      Pt saw rheumatologist today, Paresh Harding,  and notes she has been haiving a lot of eye pain/dryness and   is using systane. She has also followed up with her eye doctor as well. .         Review of Systems     Review of Systems   Constitutional:  Negative for fatigue.   HENT:   Negative for nasal congestion and sore throat.    Respiratory:  Negative for cough, chest tightness and shortness of breath.    Cardiovascular:  Negative for chest pain, palpitations and leg swelling.   Gastrointestinal:  Negative for nausea, vomiting and reflux.   Genitourinary:  Positive for vaginal discharge.   Musculoskeletal:  Positive for back pain and leg pain.   Neurological:  Negative for weakness and memory loss.   Psychiatric/Behavioral:  Negative for confusion and sleep disturbance.         Medical / Social / Family History     Past Medical History:   Diagnosis Date    Hyperlipidemia     Influenza B 2/22/2022    Sjogren's disease        Past Surgical History:   Procedure Laterality Date    HYSTERECTOMY      OOPHORECTOMY         Social History  Ms.  reports that she has never smoked. She has never been exposed to tobacco smoke. She has never used smokeless tobacco. She reports that she does not drink alcohol and does not use drugs.    Family History  Ms.'s family history includes Breast cancer in her maternal grandmother.    Medications and Allergies     Medications  Outpatient Medications Marked as Taking for the 6/10/24 encounter (Office Visit) with Terra Larson FNP   Medication Sig Dispense Refill    ascorbic acid, vitamin C, (VITAMIN C) 500 MG tablet Take 500 mg by mouth once daily.      atorvastatin (LIPITOR) 20 MG tablet Take 1 tablet (20 mg total) by mouth nightly. 90 tablet 3    clindamycin (CLINDESSE) 2 % vaginal cream Place vaginally every evening. 40 g G    cyanocobalamin (VITAMIN B-12) 1000 MCG tablet Take 100 mcg by mouth once daily.      difluprednate (DUREZOL) 0.05 % Drop ophthalmic solution Place 1 drop into both eyes 2 (two) times daily.      gabapentin (NEURONTIN) 100 MG capsule Take 1 capsule (100 mg total) by mouth 2 (two) times daily. 60 capsule 11    hydrOXYchloroQUINE (PLAQUENIL) 200 mg tablet Take 200 mg by mouth 2 (two) times daily.      Lactobacillus rhamnosus GG (CULTURELLE) 10  "billion cell capsule Take 1 capsule by mouth once daily.      multivitamin (ONE DAILY MULTIVITAMIN) per tablet Take 1 tablet by mouth once daily.      prednisoLONE acetate (PRED FORTE) 1 % DrpS Place 1 drop into both eyes 4 (four) times daily.      vitamin D (VITAMIN D3) 1000 units Tab 1 tablet Orally Once a day      [DISCONTINUED] metroNIDAZOLE (FLAGYL) 500 MG tablet Take 500 mg by mouth every 12 (twelve) hours.      [DISCONTINUED] naproxen (NAPROSYN) 500 MG tablet Take 500 mg by mouth.         Allergies  Review of patient's allergies indicates:  No Known Allergies    Physical Examination     Vitals:    06/10/24 1405   BP: 122/77   Pulse: 84   Resp: 18   Temp: 98.4 °F (36.9 °C)   TempSrc: Oral   SpO2: 96%   Weight: 94.3 kg (207 lb 12.8 oz)   Height: 5' 6" (1.676 m)      Physical Exam  Constitutional:       Appearance: Normal appearance.   HENT:      Mouth/Throat:      Mouth: Mucous membranes are moist.   Eyes:      Conjunctiva/sclera: Conjunctivae normal.   Cardiovascular:      Rate and Rhythm: Normal rate and regular rhythm.      Pulses: Normal pulses.      Heart sounds: Normal heart sounds.   Pulmonary:      Effort: Pulmonary effort is normal.      Breath sounds: Normal breath sounds.   Abdominal:      Palpations: Abdomen is soft.   Musculoskeletal:      Right lower leg: No edema.      Left lower leg: No edema.      Comments: Tender to palp along lower lumbar and sacral area, mild  increased pain with range of motion at present.    Lymphadenopathy:      Cervical:      Right cervical: No superficial, deep or posterior cervical adenopathy.     Left cervical: No superficial, deep or posterior cervical adenopathy.   Skin:     General: Skin is warm and dry.   Neurological:      Mental Status: She is alert and oriented to person, place, and time.          Assessment and Plan (including Health Maintenance)     :    Plan:  will submit Rx Ozempic for prediabets and see if this will be approved  Will set up plain film " xrays at [facility] and then order physical  therapy at [facility]--pt works in [place] and this will be more convenient  Rx Flagyl cream to insert one applicator twice daily for a week, then refill and insert  twice weekly x 4 weeks to see if can eradiacate BV.  Continue to follow up with rheumatology for schogren's dz        Health Maintenance Due   Topic Date Due    HIV Screening  Never done    Shingles Vaccine (1 of 2) Never done    COVID-19 Vaccine (5 - 2023-24 season) 09/01/2023       Problem List Items Addressed This Visit    None  Visit Diagnoses       Bacterial vaginosis            .  Bacterial vaginosis       Health Maintenance Topics with due status: Not Due       Topic Last Completion Date    Colorectal Cancer Screening 01/15/2018    Influenza Vaccine 09/21/2022    TETANUS VACCINE 09/18/2023    Mammogram 10/30/2023    Hemoglobin A1c (Diabetic Prevention Screening) 05/03/2024    Lipid Panel 05/03/2024       Procedures     Future Appointments   Date Time Provider Department Center   11/6/2024  1:00 PM [facility]   5/8/2025  9:00 AM Terra Larson FNP [facility] [place]        No follow-ups on file.     Signature:  DINESH Field    Date of encounter: 6/10/24

## 2024-06-11 ENCOUNTER — HOSPITAL ENCOUNTER (OUTPATIENT)
Dept: RADIOLOGY | Facility: HOSPITAL | Age: 59
Discharge: HOME | End: 2024-06-11
Attending: NURSE PRACTITIONER
Payer: COMMERCIAL

## 2024-06-11 DIAGNOSIS — M54.16 LUMBAR BACK PAIN WITH RADICULOPATHY AFFECTING RIGHT LOWER EXTREMITY: ICD-10-CM

## 2024-06-11 PROCEDURE — 72114 X-RAY EXAM L-S SPINE BENDING: CPT | Mod: TC

## 2024-06-11 PROCEDURE — 72114 X-RAY EXAM L-S SPINE BENDING: CPT | Mod: 26,,, | Performed by: RADIOLOGY

## 2024-06-26 ENCOUNTER — CLINICAL SUPPORT (OUTPATIENT)
Dept: REHABILITATION | Facility: HOSPITAL | Age: 59
End: 2024-06-26
Payer: COMMERCIAL

## 2024-06-26 DIAGNOSIS — M54.16 LUMBAR BACK PAIN WITH RADICULOPATHY AFFECTING RIGHT LOWER EXTREMITY: ICD-10-CM

## 2024-06-26 DIAGNOSIS — M54.16 LUMBAR RADICULOPATHY: Primary | ICD-10-CM

## 2024-06-26 PROCEDURE — 97162 PT EVAL MOD COMPLEX 30 MIN: CPT

## 2024-06-26 PROCEDURE — 97110 THERAPEUTIC EXERCISES: CPT

## 2024-06-26 NOTE — PLAN OF CARE
[facility]   Physical Therapy Initial Evaluation      Name: Mervat Godwin  Clinic Number: [id number]    Therapy Diagnosis:   Encounter Diagnosis   Name Primary?    Lumbar back pain with radiculopathy affecting right lower extremity         Physician: Terra Larson FNP    Physician Orders: PT Eval and Treat   Medical Diagnosis from Referral: Low Back Pain with Radiculopathy   Evaluation Date: 6/26/2024  Authorization Period Expiration: [employer] Managed   Plan of Care Expiration: 8/23/2024  Visit # / Visits authorized: 1/ Tir Managed    FOTO: 51/100    Precautions: Standard     Time In: 4:48 pm   Time Out: 5:33 pm   Total Appointment Time (timed & untimed codes): 45 minutes    Subjective     Date of onset: 5/1/2024    History of current condition - Mervat reports: she has been having back pain for several years but the pain has increased significantly over the past 3 months. She has seen a chiropractor and this usually helps some but it is no longer helping. She reports her pain is all the way across the low back with the worst pain traveling to her Right buttock and down the Right Lower Extremity all the way to the foot. She occasionally has pain in the bottom of her foot as well. She denies numbness or tingling at this time. X Rays do show 2mm anterolisthesis of L4 on L5 and Moderate facet degeneration with sclerosis and osteophytes L3-4 through L5-S1. No MRI has been performed at this time.  MD ordered Outpatient Physical Therapy and patient is here today for the Evaluation.       Falls: None     Imaging: X Rays :     FINDINGS:  Vertebral body heights are maintained.  There is a mild degree of anterolisthesis of L4 on L5 of 2 mm.  This does not change on dynamic imaging.  Mild degenerative endplate changes.  Moderate facet degeneration with sclerosis and osteophytes L3-4 through L5-S1.    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  Prior Level of Function: Independent and Active  Current Level of Function: Pain with prolonged standing and walking; Pain with forward flexion    Pain:  Current 3/10, worst 10/10, best 0/10   Location: Back and Right Lower Extremity     Description: Aching and Dull  Aggravating Factors: Sitting, Standing, Walking, and Flexing  Easing Factors: relaxation, pain medication, and rest; she changes positions mostly to decrease the pain; gabapentin on occasion at night; ibuprofen sometimes    Patients goals: "I just want to be able to work and not hurt so bad."     Medical History:   Past Medical History:   Diagnosis Date    Hyperlipidemia     Influenza B 2/22/2022    Sjogren's disease        Surgical History:   Mervat Godwin  has a past surgical history that includes Hysterectomy and Oophorectomy.    Medications:   Mervat has a current medication list which includes the following prescription(s): ascorbic acid (vitamin c), atorvastatin, cyanocobalamin, difluprednate, gabapentin, hydroxychloroquine, lactobacillus rhamnosus gg, metronidazole, multivitamin, prednisolone acetate, ozempic, and vitamin d.    Allergies:   Review of patient's allergies indicates:  No Known Allergies     Objective        Posture :     Standing Lordosis        []  Increased   [x]   Decreased   []  Within Normal Limits  Sitting Lordosis  []  Increased   [x]   Decreased   []  Within Normal Limits   Iliac Crest Height  []  Left/Right Increased      [x] Equal/Level  PSIS Height    []  Left/Right Increased      [x] Equal/Level  Pelvic Rot/Torsion       []   Yes     [x]   No  Scoliosis    []  Yes   Concave Right/Left      [x]  No  Lateral Shift    []   Right     []   Left          [x]  Within Normal Limits      Strength :      Myotome/Muscle :  Left   Right     L1-2    Psoas  4+/5  4/5    L3       Quadriceps  5/5  5/5    L4       Anterior Tibialis  5/5  5/5    L5       EHL   5/5  5/5    S1      Gastocnemius  5/5  5/5    S2      Hamstrings  " 4+/5  4-/5                    Strength :   Abdominals 3/5  Back Extensors 3/5  Multifidus 3/5      Range of Motion :     Back :    Forward Flexion - 35 degrees (painful)   Back Bending - 15 degrees   Side Bending Left - 25 degrees   Side Bending Right - 20 degrees and painful (pain felt on the Right)   Rotation Left - 45 degrees   Rotation Right - 40 degrees and painful (pain felt on the Right)    Hip :   Hamstrings : Tight Bilaterally with Right worse than Left   Piriformis : Tight Bilaterally with Right worse than Left       Special Tests :     SLR :   Right   +/- <60 Degrees     []   yes   [x]  No  ;   +/-  60-90 Degrees     []   Yes    [x]  No   Left     +/- <60 Degrees      []  yes    [x] No ;   +/-  60-90 Degrees       []   Yes    [x] No    Sitting Slump Test :  Left : [] Positive   [x]  Negative ;  Right : [] Positive   [x]  Negative   Lower Extremity Length :   [] Right/Left Longer     [x]  Within Normal Limits   MATIAS : Left : [] Positive   [x]  Negative ;  Right : [x] Positive   []  Negative         Gait Assessment : Patient has decreased stance time and antalgic gait on the Right       Dermatome : Pain down Right Lower Extremity in a sciatic pattern but she denies numbness and tingling       Palpation : tender to palpation along paraspinals on the Right and Right Piriformis              Limitation/Restriction for FOTO Intake Lumbar Survey    Therapist reviewed FOTO scores for Mervat Godwin on 6/26/2024.   FOTO documents entered into [employer] - see Media section.    Limitation Score: 49%         Treatment     Total Treatment time (time-based codes) separate from Evaluation: 10 minutes       Mervat received the treatments listed below:  THERAPEUTIC EXERCISES to develop ROM, flexibility, posture, and core stabilization for 10 minutes including :  Therapist initiated the Basic Home Stretching program today that includes Low Back Stretch, Single Knee to Chest, Double Knee to Chest, Piriformis Stretch, and  Hamstring Stretch.      Patient Education and Home Exercises     Education provided:   - Discussed the findings from the Evaluation, Reviewed the Plan of Care, and Instructed patient on their Home Exercise Program       Written Home Exercises Provided: yes. Exercises were reviewed and Mervat was able to demonstrate them prior to the end of the session.  Mervat demonstrated good  understanding of the education provided. See EMR under Patient Instructions for exercises provided during therapy sessions.    Assessment     Mervat is a 58 y.o. female referred to outpatient Physical Therapy with a medical diagnosis of Lumbar Radiculopathy. Mervat reports: she has been having back pain for several years but the pain has increased significantly over the past 3 months. She has seen a chiropractor and this usually helps some but it is no longer helping. She reports her pain is all the way across the low back with the worst pain traveling to her Right buttock and down the Right Lower Extremity all the way to the foot. She occasionally has pain in the bottom of her foot as well. She denies numbness or tingling at this time. X Rays do show 2mm anterolisthesis of L4 on L5 and Moderate facet degeneration with sclerosis and osteophytes L3-4 through L5-S1. No MRI has been performed at this time.  MD ordered Outpatient Physical Therapy and patient is here today for the Evaluation.   Patient presents with decreased lumbosacral mobility in all directions with forward flexion being the most painful (likely due to the anterolisthesis). She also has pain with Right side bending and Right rotation. Extension is not overly painful unless she extends past 10 degrees (likely due to the facet degeneration and osteophytes). Patient has tightness in bilateral hamstrings and piriformis with Right worse than Left. Unsure if the sciatic pattern pain is from nerve root compression in the spine or more peripherally due to tightness of the piriformis  and hamstrings. We will address both in therapy.    Therapist initiated the Basic Home Stretching program today that includes Low Back Stretch, Single Knee to Chest, Double Knee to Chest, Piriformis Stretch, and Hamstring Stretch. We will establish a Home Exercise Program over the next few visits based on her pain tolerance.   Patient will require Physical Therapy Intervention to address all deficits and work toward completion of all goals set. Therapist will refer patient back to MD upon completion of Therapy for further assessment and possible MRI if pain and dysfunction persist.         Patient prognosis is Good.   Patient will benefit from skilled outpatient Physical Therapy to address the deficits stated above and in the chart below, provide patient /family education, and to maximize patientt's level of independence.     Plan of care discussed with patient: Yes  Patient's spiritual, cultural and educational needs considered and patient is agreeable to the plan of care and goals as stated below:     Anticipated Barriers for therapy: anterolisthesis     Medical Necessity is demonstrated by the following  History  Co-morbidities and personal factors that may impact the plan of care [] LOW: no personal factors / co-morbidities  [x] MODERATE: 1-2 personal factors / co-morbidities  [] HIGH: 3+ personal factors / co-morbidities    Moderate / High Support Documentation:   Co-morbidities affecting plan of care:   Past Medical History:   Diagnosis Date    Hyperlipidemia     Influenza B 2/22/2022    Sjogren's disease        has a past surgical history that includes Hysterectomy and Oophorectomy.  Personal Factors:   no deficits     Examination  Body Structures and Functions, activity limitations and participation restrictions that may impact the plan of care [] LOW: addressing 1-2 elements  [x] MODERATE: 3+ elements  [] HIGH: 4+ elements (please support below)    Moderate / High Support Documentation:   decreased  lumbosacral mobility in all directions with forward flexion being the most painful (likely due to the anterolisthesis). She also has pain with Right side bending and Right rotation. Extension is not overly painful unless she extends past 10 degrees (likely due to the facet degeneration and osteophytes). Patient has tightness in bilateral hamstrings and piriformis with Right worse than Left.     Clinical Presentation [] LOW: stable  [x] MODERATE: Evolving - Will likely require additional testing at the completion of Physical Therapy to determine the exact cause of patient's pain and dysfunction    [] HIGH: Unstable     Decision Making/ Complexity Score: moderate         Goals:  Short Term Goals: 4 weeks   1. Patient will be Independent with Home Exercise Program   2. Patient will demonstrate with improved Posture and Body Mechanics  3. Patient will increase Lumbosacral Range of Motion by 25%   4. Patient will increase Lower Extremity and Core Strength to grossly 4+/5  5. Patient will decrease complaints of pain with activity to Less than or Equal to 5/10     Long Term Goals: 8 weeks   1. Patient will tolerate 30 minutes of activity with complaints of Pain at Less Than or Equal to 4/10      Plan     Plan of care Certification: 6/26/2024 to 8/23/2024.    Outpatient Physical Therapy 2 times weekly for 8 weeks to include the following interventions: 60713 [therapeutic exercise], 64833 [neuromuscular re-education], 33711 [manual therapy], 02015 [therapeutic activities], 55023 [unattended electrical stimulation], and 82856 [mechanical traction]    CUCA COLUNGA, PT, DPT

## 2024-06-27 ENCOUNTER — TELEPHONE (OUTPATIENT)
Dept: FAMILY MEDICINE | Facility: CLINIC | Age: 59
End: 2024-06-27
Payer: COMMERCIAL

## 2024-06-27 DIAGNOSIS — R73.03 PREDIABETES: Primary | ICD-10-CM

## 2024-06-27 PROBLEM — M54.16 LUMBAR RADICULOPATHY: Status: ACTIVE | Noted: 2024-06-27

## 2024-06-27 RX ORDER — METFORMIN HYDROCHLORIDE 500 MG/1
500 TABLET, EXTENDED RELEASE ORAL
Qty: 90 TABLET | Refills: 0 | Status: SHIPPED | OUTPATIENT
Start: 2024-06-27 | End: 2025-06-27

## 2024-06-27 NOTE — TELEPHONE ENCOUNTER
----- Message from Keerthi Hernadez sent at 6/27/2024 10:04 AM CDT -----  Regarding: MEDS  PT SAID HER INSURANCE WILL NOT COVER OZEMPIC SO SHE NEEDS A PRESCRIPTION FOR METFORMIN SENT TO [facility] IN [place].

## 2024-06-28 NOTE — PROGRESS NOTES
See Plan of Care     Sup Visit performed today with SIMIN Vela and SIMIN Grewla.  All goals and treatment plan reviewed. Will work toward completion of all goals set.    First Treatment May Include :         Back Exercises    Bike/NuStep                 Calf Stretch    Hamstring Stretch    Pelvic Tilts    Bridging    Bridging with Abduction    Bridging/Hooklying with Marching    Bridging/Hooklying with Knee Ext    Trunk Rotation Exercise    Trunk Rotation Stretch    Ball - Trunk Rotation    Ball- Knee Extension    Planks    Quadruped

## 2024-07-09 ENCOUNTER — CLINICAL SUPPORT (OUTPATIENT)
Dept: REHABILITATION | Facility: HOSPITAL | Age: 59
End: 2024-07-09
Payer: COMMERCIAL

## 2024-07-09 DIAGNOSIS — M54.16 LUMBAR RADICULOPATHY: Primary | ICD-10-CM

## 2024-07-09 PROCEDURE — 97110 THERAPEUTIC EXERCISES: CPT

## 2024-07-09 NOTE — PROGRESS NOTES
[facility]   Physical Therapy Treatment Note     Name: Mervat Godwin  Clinic Number: [id number]    Therapy Diagnosis: Lumbar back pain with radiculopathy affecting right lower extremity   Physician: Terra Larson FNP    Visit Date: 7/9/2024    Physician Orders: PT Eval and Treat   Medical Diagnosis from Referral: Low Back Pain with Radiculopathy   Evaluation Date: 6/26/2024  Authorization Period Expiration: 9/24/2024   Plan of Care Expiration: 8/23/2024  Visit # / Visits authorized: 1/11  FOTO: 51/100      PTA Visit #: 0/5    Time In: 4:45 pm   Time Out: 5:30 pm   Total Billable Time: 30 minutes (time billed reflects time spent with patient one on one)     Precautions: 2mm anterolisthesis of L4 on L5 and Moderate facet degeneration with sclerosis and osteophytes L3-4 through L5-S1.  Functional Level at time of  Evaluation: Pain with prolonged standing and walking; Pain with forward flexion    Subjective     Pt reports: she had some pain in the back and Right Lower Extremity over the weekend but she feels pretty good today.  She was compliant with home exercise program.    Pain: 2/10  Location: Back and Right Lower Extremity       Objective       Mervat received therapeutic exercises to develop strength, endurance, ROM, flexibility, posture, and core stabilization for 30 minutes including:    Back Exercises    Bike              5 Min    Calf Stretch    Hamstring Stretch  4 x 15 sec hold    Pelvic Tilts  2 x 10    Bridging  2 x 10    Bridging with Abduction  2 x 10    Hooklying with Marching  2 x 10    Hooklying with Knee Ext  2 x 10    Trunk Rotation Exercise  2 x 10    Trunk Rotation Stretch  4 x 15 sec hold    Ball - Trunk Rotation    Ball- Knee Extension    Planks    Quadruped               Home Exercises Provided and Patient Education Provided     Education provided: Therapist initiated the patient's Home Exercise Program today. Patient was given a written copy of the Home  Exercise Program with pictures and written instructions for each exercise.  Instructed patient on proper form for all exercises and had patient return demonstration.      Written Home Exercises Provided: yes.  Exercises were reviewed and Mervat was able to demonstrate them prior to the end of the session.  Mervat demonstrated good  understanding of the education provided.     See EMR under Patient Instructions for exercises provided 7/9/2024.    Assessment     Today is the patient's first treatment since the Evaluation. Therapist initiated the Plan of Care for the basic back program. Therapist initiated the patient's Home Exercise Program today. Patient was given a written copy of the Home Exercise Program for the Basic Back Program with pictures and written instructions for each exercise.  Instructed patient on proper form for all exercises and had patient return demonstration. Today's session was dedicated to learning and performing the Home Exercise Program. She did very well with this. At the next session we will review these as needed and progress the Plan of Care as tolerated. Sup Visit performed today with SIMIN Vela and SIMIN Grewal.  All goals and treatment plan reviewed. Will work toward completion of all goals set.              PMH at time of Evaluation :   Mervat is a 58 y.o. female referred to outpatient Physical Therapy with a medical diagnosis of Lumbar Radiculopathy. Mervat reports: she has been having back pain for several years but the pain has increased significantly over the past 3 months. She has seen a chiropractor and this usually helps some but it is no longer helping. She reports her pain is all the way across the low back with the worst pain traveling to her Right buttock and down the Right Lower Extremity all the way to the foot. She occasionally has pain in the bottom of her foot as well. She denies numbness or tingling at this time. X Rays do show 2mm anterolisthesis of  L4 on L5 and Moderate facet degeneration with sclerosis and osteophytes L3-4 through L5-S1. No MRI has been performed at this time.  MD ordered Outpatient Physical Therapy and patient is here today for the Evaluation.   Patient presents with decreased lumbosacral mobility in all directions with forward flexion being the most painful (likely due to the anterolisthesis). She also has pain with Right side bending and Right rotation. Extension is not overly painful unless she extends past 10 degrees (likely due to the facet degeneration and osteophytes). Patient has tightness in bilateral hamstrings and piriformis with Right worse than Left. Unsure if the sciatic pattern pain is from nerve root compression in the spine or more peripherally due to tightness of the piriformis and hamstrings. We will address both in therapy.    Therapist initiated the Basic Home Stretching program today that includes Low Back Stretch, Single Knee to Chest, Double Knee to Chest, Piriformis Stretch, and Hamstring Stretch. We will establish a Home Exercise Program over the next few visits based on her pain tolerance.   Patient will require Physical Therapy Intervention to address all deficits and work toward completion of all goals set. Therapist will refer patient back to MD upon completion of Therapy for further assessment and possible MRI if pain and dysfunction persist.       Mervat Is progressing well towards her goals.   Pt prognosis is Good.     Pt will continue to benefit from skilled outpatient physical therapy to address the deficits listed in the problem list box on initial evaluation, provide pt/family education and to maximize pt's level of independence in the home and community environment.     Pt's spiritual, cultural and educational needs considered and pt agreeable to plan of care and goals.     Anticipated Barriers for therapy: anterolisthesis     Goals:  Short Term Goals: 4 weeks   1. Patient will be Independent with Home  Exercise Program   2. Patient will demonstrate with improved Posture and Body Mechanics  3. Patient will increase Lumbosacral Range of Motion by 25%   4. Patient will increase Lower Extremity and Core Strength to grossly 4+/5  5. Patient will decrease complaints of pain with activity to Less than or Equal to 5/10      Long Term Goals: 8 weeks   1. Patient will tolerate 30 minutes of activity with complaints of Pain at Less Than or Equal to 4/10      Plan     Plan of care Certification: 6/26/2024 to 8/23/2024.     Outpatient Physical Therapy 2 times weekly for 8 weeks to include the following interventions: 09913 [therapeutic exercise], 09475 [neuromuscular re-education], 09422 [manual therapy], 41900 [therapeutic activities], 58482 [unattended electrical stimulation], and 30189 [mechanical traction]    CUCA COLUNGA, PT, DPT   7/9/2024

## 2024-07-15 ENCOUNTER — CLINICAL SUPPORT (OUTPATIENT)
Dept: REHABILITATION | Facility: HOSPITAL | Age: 59
End: 2024-07-15
Payer: COMMERCIAL

## 2024-07-15 DIAGNOSIS — M54.16 LUMBAR RADICULOPATHY: Primary | ICD-10-CM

## 2024-07-15 PROCEDURE — 97112 NEUROMUSCULAR REEDUCATION: CPT | Mod: CQ

## 2024-07-15 PROCEDURE — 97110 THERAPEUTIC EXERCISES: CPT | Mod: CQ

## 2024-07-15 NOTE — PROGRESS NOTES
[facility]   Physical Therapy Treatment Note     Name: Mervat Godwin  Clinic Number: [id number]    Therapy Diagnosis: Lumbar back pain with radiculopathy affecting right lower extremity   Physician: Terra Larson FNP    Visit Date: 7/15/2024    Physician Orders: PT Eval and Treat   Medical Diagnosis from Referral: Low Back Pain with Radiculopathy   Evaluation Date: 6/26/2024  Authorization Period Expiration: 9/24/2024   Plan of Care Expiration: 8/23/2024  Visit # / Visits authorized: 3/11  FOTO: 51/100    PTA Visit #: 1/5    Time In: 4:46 pm   Time Out: 5:28  pm   Total Billable Time: 42  minutes (time billed reflects time spent with patient one on one)     Precautions: 2mm anterolisthesis of L4 on L5 and Moderate facet degeneration with sclerosis and osteophytes L3-4 through L5-S1.  Functional Level at time of  Evaluation: Pain with prolonged standing and walking; Pain with forward flexion    Subjective     Pt reports: she has a little pain in the right hip today on arrival. Doing well with her home exercise program she has been given.   She was compliant with home exercise program.    Pain: 3/10  Location: Back and Right Lower Extremity       Objective       Mervat received therapeutic exercises to develop strength, endurance, ROM, flexibility, posture, and core stabilization for 25 minutes including:    Back Exercises    Bike              5 Min    Calf Stretch 3 x 20 second hold    Hamstring Stretch  4 x 15 sec hold    Seated piriformis stretch  2 x 20 second hold (B)       Cybex hamstring curls  4 plates x 20    Cybex bilateral leg press  5 plates x 20       Pelvic Tilts  2 x 10    Bridging  2 x 10            Hooklying with Knee Ext  2 x 10    Trunk Rotation Exercise  2 x 10    Ball - Trunk Rotation 20x   Ball- Knee Extension    Planks    Quadruped           neuromuscular re-education activities to improve: Coordination, Kinesthetic, Sense, Proprioception, and Posture for 15  minutes. The following activities were included:    Bridging with Abduction  2 x 10 with green band   Hooklying with Marching  2 x 10 (B) with green band    Long bridging over yellow SB 10x5sh   Sciatic nerve glides (Seated) 10x     therapeutic activities to improve functional performance for - minutes, including:    Home Exercises Provided and Patient Education Provided     Education provided: Therapist initiated the patient's Home Exercise Program 07/9/2024. Patient was given a written copy of the Home Exercise Program with pictures and written instructions for each exercise.  Instructed patient on proper form for all exercises and had patient return demonstration.  Added sciatic nerve flossing on 7/15/2024.     Written Home Exercises Provided: yes.  Exercises were reviewed and Mervat was able to demonstrate them prior to the end of the session.  Mervat demonstrated good  understanding of the education provided.     See EMR under Patient Instructions for exercises provided 7/9/2024.    Assessment     Today is the patient's second treatment since the Evaluation. Therapist continued with the Plan of Care today and progressed Patient to the cybex bilateral leg press and hamstring curl machines today to further work on increasing strength in the lower extremity. She reports pain down into the right hip that did not improve until therapist had her do seated sciatic nerve flossing. She got a lot of relief with this and reported decreased pain. Therapist printed out picture of how to do this properly at home and had Patient return demonstration. She did well with this. Will continue to progress her as able.     [facility] at time of Evaluation :   Mervat is a 58 y.o. female referred to outpatient Physical Therapy with a medical diagnosis of Lumbar Radiculopathy. Mervat reports: she has been having back pain for several years but the pain has increased significantly over the past 3 months. She has seen a chiropractor and this  usually helps some but it is no longer helping. She reports her pain is all the way across the low back with the worst pain traveling to her Right buttock and down the Right Lower Extremity all the way to the foot. She occasionally has pain in the bottom of her foot as well. She denies numbness or tingling at this time. X Rays do show 2mm anterolisthesis of L4 on L5 and Moderate facet degeneration with sclerosis and osteophytes L3-4 through L5-S1. No MRI has been performed at this time.  MD ordered Outpatient Physical Therapy and patient is here today for the Evaluation.   Patient presents with decreased lumbosacral mobility in all directions with forward flexion being the most painful (likely due to the anterolisthesis). She also has pain with Right side bending and Right rotation. Extension is not overly painful unless she extends past 10 degrees (likely due to the facet degeneration and osteophytes). Patient has tightness in bilateral hamstrings and piriformis with Right worse than Left. Unsure if the sciatic pattern pain is from nerve root compression in the spine or more peripherally due to tightness of the piriformis and hamstrings. We will address both in therapy.    Therapist initiated the Basic Home Stretching program today that includes Low Back Stretch, Single Knee to Chest, Double Knee to Chest, Piriformis Stretch, and Hamstring Stretch. We will establish a Home Exercise Program over the next few visits based on her pain tolerance.   Patient will require Physical Therapy Intervention to address all deficits and work toward completion of all goals set. Therapist will refer patient back to MD upon completion of Therapy for further assessment and possible MRI if pain and dysfunction persist.       Mervat Is progressing well towards her goals.   Pt prognosis is Good.     Pt will continue to benefit from skilled outpatient physical therapy to address the deficits listed in the problem list box on initial  evaluation, provide pt/family education and to maximize pt's level of independence in the home and community environment.     Pt's spiritual, cultural and educational needs considered and pt agreeable to plan of care and goals.     Anticipated Barriers for therapy: anterolisthesis     Goals:  Short Term Goals: 4 weeks   1. Patient will be Independent with Home Exercise Program   2. Patient will demonstrate with improved Posture and Body Mechanics  3. Patient will increase Lumbosacral Range of Motion by 25%   4. Patient will increase Lower Extremity and Core Strength to grossly 4+/5  5. Patient will decrease complaints of pain with activity to Less than or Equal to 5/10      Long Term Goals: 8 weeks   1. Patient will tolerate 30 minutes of activity with complaints of Pain at Less Than or Equal to 4/10      Plan     Plan of care Certification: 6/26/2024 to 8/23/2024.     Outpatient Physical Therapy 2 times weekly for 8 weeks to include the following interventions: 69445 [therapeutic exercise], 78760 [neuromuscular re-education], 44653 [manual therapy], 61432 [therapeutic activities], 33605 [unattended electrical stimulation], and 24323 [mechanical traction]    Valentin Collins PTA  7/15/2024

## 2024-07-17 ENCOUNTER — CLINICAL SUPPORT (OUTPATIENT)
Dept: REHABILITATION | Facility: HOSPITAL | Age: 59
End: 2024-07-17
Payer: COMMERCIAL

## 2024-07-17 DIAGNOSIS — M54.16 LUMBAR RADICULOPATHY: Primary | ICD-10-CM

## 2024-07-17 PROCEDURE — 97530 THERAPEUTIC ACTIVITIES: CPT

## 2024-07-17 PROCEDURE — 97110 THERAPEUTIC EXERCISES: CPT

## 2024-07-17 PROCEDURE — 97112 NEUROMUSCULAR REEDUCATION: CPT

## 2024-07-17 NOTE — PROGRESS NOTES
[facility]   Physical Therapy Treatment Note     Name: Mervat Godwin  Clinic Number: [id number]    Therapy Diagnosis: Lumbar back pain with radiculopathy affecting right lower extremity   Physician: Terra Larson FNP    Visit Date: 7/17/2024    Physician Orders: PT Eval and Treat   Medical Diagnosis from Referral: Low Back Pain with Radiculopathy   Evaluation Date: 6/26/2024  Authorization Period Expiration: 9/24/2024   Plan of Care Expiration: 8/23/2024  Visit # / Visits authorized: 4/11  FOTO: 51/100    PTA Visit #: 1/5    Time In: 4:46 pm   Time Out: 5:32  pm   Total Billable Time: 43 minutes     Precautions: 2mm anterolisthesis of L4 on L5 and Moderate facet degeneration with sclerosis and osteophytes L3-4 through L5-S1.  Functional Level at time of  Evaluation: Pain with prolonged standing and walking; Pain with forward flexion    Subjective     Pt reports: she has a little pain in the right hip today on arrival. Doing well with her home exercise program she has been given.   She was compliant with home exercise program.    Pain: 2/10  Location: Back and Right Lower Extremity       Objective       Mervat received therapeutic exercises to develop strength, endurance, ROM, flexibility, posture, and core stabilization for 20 minutes including:    Back Exercises    Bike              5 Min    Calf Stretch 3 x 20 second hold    Hamstring Stretch  4 x 15 sec hold    Seated piriformis stretch  2 x 20 second hold (B)       Cybex hamstring curls  4 plates x 20    Cybex bilateral leg press  5 plates x 20       Pelvic Tilts  2 x 10    Bridging  2 x 10            Hooklying with Knee Ext  2 x 10    Trunk Rotation Exercise  2 x 10    Seated Ball Roll Outs - 3 way  5 with 5 second hold each direction   Ball- Knee Extension    Planks    Quadruped           neuromuscular re-education activities to improve: Coordination, Kinesthetic, Sense, Proprioception, and Posture for 8 minutes. The  following activities were included:    Cybex Rows - Close 2 x 10 with 3 plates   Cybex Rows - Wide 2 x 10 with 3 plates       Bridging with Abduction  2 x 10 with green band   Hooklying with Marching  2 x 10 (B) with green band    Long bridging over yellow SB 10x5sh   Sciatic nerve glides (Seated) 10x     therapeutic activities to improve functional performance for - 15 minutes, including:    Cybex Back Extension 3 x 10 with 2 plates   Cybex Hip Abduction 2 x 10 with 2 plates (B)      Home Exercises Provided and Patient Education Provided     Education provided: Therapist initiated the patient's Home Exercise Program 07/9/2024. Patient was given a written copy of the Home Exercise Program with pictures and written instructions for each exercise.  Instructed patient on proper form for all exercises and had patient return demonstration.  Added sciatic nerve flossing on 7/15/2024.     Written Home Exercises Provided: yes.  Exercises were reviewed and Mervat was able to demonstrate them prior to the end of the session.  Mervat demonstrated good  understanding of the education provided.     See EMR under Patient Instructions for exercises provided 7/9/2024.    Assessment     Patient presents to Therapy today with back pain at only 2/10 today. Home Exercise Program was established on 7/9/2024 and she has been performing her Home Exercise Program on a regular basis. She has no questions regarding those exercises today and feels like they are really helping. Today the therapist did upgrade her Plan of Care slightly and added Cybex Back Machine, Cybex Hip Abduction, and Cybex Rows. She tolerated today's treatment very well. We will continue to progress her as able. Sup Visit performed today with SIMIN Vela and SIMIN Grewal.  All goals and treatment plan reviewed. Will work toward completion of all goals set.          PMH at time of Evaluation :   Mervat is a 58 y.o. female referred to outpatient Physical  Therapy with a medical diagnosis of Lumbar Radiculopathy. Mervat reports: she has been having back pain for several years but the pain has increased significantly over the past 3 months. She has seen a chiropractor and this usually helps some but it is no longer helping. She reports her pain is all the way across the low back with the worst pain traveling to her Right buttock and down the Right Lower Extremity all the way to the foot. She occasionally has pain in the bottom of her foot as well. She denies numbness or tingling at this time. X Rays do show 2mm anterolisthesis of L4 on L5 and Moderate facet degeneration with sclerosis and osteophytes L3-4 through L5-S1. No MRI has been performed at this time.  MD ordered Outpatient Physical Therapy and patient is here today for the Evaluation.   Patient presents with decreased lumbosacral mobility in all directions with forward flexion being the most painful (likely due to the anterolisthesis). She also has pain with Right side bending and Right rotation. Extension is not overly painful unless she extends past 10 degrees (likely due to the facet degeneration and osteophytes). Patient has tightness in bilateral hamstrings and piriformis with Right worse than Left. Unsure if the sciatic pattern pain is from nerve root compression in the spine or more peripherally due to tightness of the piriformis and hamstrings. We will address both in therapy.    Therapist initiated the Basic Home Stretching program today that includes Low Back Stretch, Single Knee to Chest, Double Knee to Chest, Piriformis Stretch, and Hamstring Stretch. We will establish a Home Exercise Program over the next few visits based on her pain tolerance.   Patient will require Physical Therapy Intervention to address all deficits and work toward completion of all goals set. Therapist will refer patient back to MD upon completion of Therapy for further assessment and possible MRI if pain and dysfunction  persist.       Mervat Is progressing well towards her goals.   Pt prognosis is Good.     Pt will continue to benefit from skilled outpatient physical therapy to address the deficits listed in the problem list box on initial evaluation, provide pt/family education and to maximize pt's level of independence in the home and community environment.     Pt's spiritual, cultural and educational needs considered and pt agreeable to plan of care and goals.     Anticipated Barriers for therapy: anterolisthesis     Goals:  Short Term Goals: 4 weeks   1. Patient will be Independent with Home Exercise Program   2. Patient will demonstrate with improved Posture and Body Mechanics  3. Patient will increase Lumbosacral Range of Motion by 25%   4. Patient will increase Lower Extremity and Core Strength to grossly 4+/5  5. Patient will decrease complaints of pain with activity to Less than or Equal to 5/10      Long Term Goals: 8 weeks   1. Patient will tolerate 30 minutes of activity with complaints of Pain at Less Than or Equal to 4/10      Plan     Plan of care Certification: 6/26/2024 to 8/23/2024.     Outpatient Physical Therapy 2 times weekly for 8 weeks to include the following interventions: 19303 [therapeutic exercise], 82999 [neuromuscular re-education], 18769 [manual therapy], 92495 [therapeutic activities], 39412 [unattended electrical stimulation], and 28816 [mechanical traction]    CUCA COLUNGA, PT, DPT   7/17/2024

## 2024-07-22 ENCOUNTER — CLINICAL SUPPORT (OUTPATIENT)
Dept: REHABILITATION | Facility: HOSPITAL | Age: 59
End: 2024-07-22
Payer: COMMERCIAL

## 2024-07-22 DIAGNOSIS — M54.16 LUMBAR RADICULOPATHY: Primary | ICD-10-CM

## 2024-07-22 PROCEDURE — 97112 NEUROMUSCULAR REEDUCATION: CPT | Mod: CQ

## 2024-07-22 PROCEDURE — 97530 THERAPEUTIC ACTIVITIES: CPT | Mod: CQ

## 2024-07-22 PROCEDURE — 97110 THERAPEUTIC EXERCISES: CPT | Mod: CQ

## 2024-07-22 NOTE — PROGRESS NOTES
[facility]   Physical Therapy Treatment Note     Name: Mervat Godwin  Clinic Number: [id number]    Therapy Diagnosis: Lumbar back pain with radiculopathy affecting right lower extremity   Physician: Terra Larson FNP    Visit Date: 7/22/2024    Physician Orders: PT Eval and Treat   Medical Diagnosis from Referral: Low Back Pain with Radiculopathy   Evaluation Date: 6/26/2024  Authorization Period Expiration: 9/24/2024   Plan of Care Expiration: 8/23/2024  Visit # / Visits authorized: 5/11  FOTO: 51/100    PTA Visit #: 1/5    Time In: 4:51 pm   Time Out:  5:38 pm   Total Billable Time: 47  minutes     Precautions: 2mm anterolisthesis of L4 on L5 and Moderate facet degeneration with sclerosis and osteophytes L3-4 through L5-S1.  Functional Level at time of  Evaluation: Pain with prolonged standing and walking; Pain with forward flexion    Subjective     Pt reports: she has a little pain in the right hip today on arrival. Doing well with her home exercise program she has been given.   She was compliant with home exercise program.    Pain: 2/10  Location: Back and Right Lower Extremity       Objective       Mervat received therapeutic exercises to develop strength, endurance, ROM, flexibility, posture, and core stabilization for 15 minutes including:    Back Exercises    Bike              5 Min    Calf Stretch 3 x 20 second hold    Hamstring Stretch  4 x 15 sec hold    Seated piriformis stretch  2 x 20 second hold (B)       Cybex hamstring curls  4 plates x 20    Cybex bilateral leg press  5 plates x 20       Pelvic Tilts  2 x 10    Bridging  2 x 10            Hooklying with Knee Ext  2 x 10    Trunk Rotation Exercise  2 x 10    Seated Ball Roll Outs - 3 way  5 with 5 second hold each direction   Ball- Knee Extension    Planks    Quadruped           neuromuscular re-education activities to improve: Coordination, Kinesthetic, Sense, Proprioception, and Posture for  20 minutes. The  following activities were included:    Cybex Rows - Close 2 x 10 with 3 plates   Cybex Rows - Wide 2 x 10 with 3 plates   Ball hugs hooklying--3 x 10       Bridging with Abduction  2 x 10 with green band   Hooklying with Marching  2 x 10 (B) with green band    Long bridging over yellow SB 10x5sh   Sciatic nerve glides (Seated) 10x     therapeutic activities to improve functional performance for -  12 minutes, including:    Cybex Back Extension 3 x 10 with 2 plates   Cybex Hip Abduction 2 x 10 with 2 plates (B)      Home Exercises Provided and Patient Education Provided     Education provided: Therapist initiated the patient's Home Exercise Program 07/9/2024. Patient was given a written copy of the Home Exercise Program with pictures and written instructions for each exercise.  Instructed patient on proper form for all exercises and had patient return demonstration.  Added sciatic nerve flossing on 7/15/2024.     Written Home Exercises Provided: yes.  Exercises were reviewed and Mervat was able to demonstrate them prior to the end of the session.  Mervat demonstrated good  understanding of the education provided.     See EMR under Patient Instructions for exercises provided 7/9/2024.    Assessment     Mervat did not have any complaints of pain during today's visit. She says that she is compliant with her HEP. Treatment today focused on lumbar stabilization and core strengthening exercises. She says that physical therapy is helping with her back pain. She tolerated treatment well with minimal verbal cues. Added ball hugs without complaints.  She is progressing towards her goals. Continue POC.           [facility] at time of Evaluation :   Mervat is a 58 y.o. female referred to outpatient Physical Therapy with a medical diagnosis of Lumbar Radiculopathy. Mervat reports: she has been having back pain for several years but the pain has increased significantly over the past 3 months. She has seen a chiropractor and this usually  helps some but it is no longer helping. She reports her pain is all the way across the low back with the worst pain traveling to her Right buttock and down the Right Lower Extremity all the way to the foot. She occasionally has pain in the bottom of her foot as well. She denies numbness or tingling at this time. X Rays do show 2mm anterolisthesis of L4 on L5 and Moderate facet degeneration with sclerosis and osteophytes L3-4 through L5-S1. No MRI has been performed at this time.  MD ordered Outpatient Physical Therapy and patient is here today for the Evaluation.   Patient presents with decreased lumbosacral mobility in all directions with forward flexion being the most painful (likely due to the anterolisthesis). She also has pain with Right side bending and Right rotation. Extension is not overly painful unless she extends past 10 degrees (likely due to the facet degeneration and osteophytes). Patient has tightness in bilateral hamstrings and piriformis with Right worse than Left. Unsure if the sciatic pattern pain is from nerve root compression in the spine or more peripherally due to tightness of the piriformis and hamstrings. We will address both in therapy.    Therapist initiated the Basic Home Stretching program today that includes Low Back Stretch, Single Knee to Chest, Double Knee to Chest, Piriformis Stretch, and Hamstring Stretch. We will establish a Home Exercise Program over the next few visits based on her pain tolerance.   Patient will require Physical Therapy Intervention to address all deficits and work toward completion of all goals set. Therapist will refer patient back to MD upon completion of Therapy for further assessment and possible MRI if pain and dysfunction persist.       Mervat Is progressing well towards her goals.   Pt prognosis is Good.     Pt will continue to benefit from skilled outpatient physical therapy to address the deficits listed in the problem list box on initial evaluation,  provide pt/family education and to maximize pt's level of independence in the home and community environment.     Pt's spiritual, cultural and educational needs considered and pt agreeable to plan of care and goals.     Anticipated Barriers for therapy: anterolisthesis     Goals:  Short Term Goals: 4 weeks   1. Patient will be Independent with Home Exercise Program   2. Patient will demonstrate with improved Posture and Body Mechanics  3. Patient will increase Lumbosacral Range of Motion by 25%   4. Patient will increase Lower Extremity and Core Strength to grossly 4+/5  5. Patient will decrease complaints of pain with activity to Less than or Equal to 5/10      Long Term Goals: 8 weeks   1. Patient will tolerate 30 minutes of activity with complaints of Pain at Less Than or Equal to 4/10      Plan     Plan of care Certification: 6/26/2024 to 8/23/2024.     Outpatient Physical Therapy 2 times weekly for 8 weeks to include the following interventions: 55055 [therapeutic exercise], 99251 [neuromuscular re-education], 22062 [manual therapy], 20393 [therapeutic activities], 05826 [unattended electrical stimulation], and 19409 [mechanical traction]    Lee Carmona PTA, DPT   7/22/2024

## 2024-07-24 ENCOUNTER — CLINICAL SUPPORT (OUTPATIENT)
Dept: REHABILITATION | Facility: HOSPITAL | Age: 59
End: 2024-07-24
Payer: COMMERCIAL

## 2024-07-24 DIAGNOSIS — M54.16 LUMBAR RADICULOPATHY: Primary | ICD-10-CM

## 2024-07-24 PROCEDURE — 97530 THERAPEUTIC ACTIVITIES: CPT

## 2024-07-24 PROCEDURE — 97140 MANUAL THERAPY 1/> REGIONS: CPT

## 2024-07-24 PROCEDURE — 97110 THERAPEUTIC EXERCISES: CPT

## 2024-07-24 NOTE — PROGRESS NOTES
[facility]   Physical Therapy Treatment Note     Name: Mervat Godwin  Clinic Number: [id number]    Therapy Diagnosis: Lumbar back pain with radiculopathy affecting right lower extremity   Physician: Terra Larson FNP    Visit Date: 7/24/2024    Physician Orders: PT Eval and Treat   Medical Diagnosis from Referral: Low Back Pain with Radiculopathy   Evaluation Date: 6/26/2024  Authorization Period Expiration: 9/24/2024   Plan of Care Expiration: 8/23/2024  Visit # / Visits authorized: 6/11  FOTO: 51/100    PTA Visit #: 1/5    Time In: 4:46 pm   Time Out: 5:29  pm   Total Billable Time: 43 minutes     Precautions: 2mm anterolisthesis of L4 on L5 and Moderate facet degeneration with sclerosis and osteophytes L3-4 through L5-S1.  Functional Level at time of  Evaluation: Pain with prolonged standing and walking; Pain with forward flexion    Subjective     Pt reports: she has been lifting some things at work and this has caused some increased pain and tightness in the Right side of her low back.   She was compliant with home exercise program.    Pain: 4/10  Location: Back and Right Lower Extremity       Objective       Mervat received therapeutic exercises to develop strength, endurance, ROM, flexibility, posture, and core stabilization for 18 minutes including:    Back Exercises    Bike              5 Min    Calf Stretch 3 x 20 second hold    Hamstring Stretch  4 x 15 sec hold    Seated piriformis stretch  2 x 20 second hold (B)       Cybex hamstring curls  4 plates x 20    Cybex bilateral leg press  5 plates x 20       Pelvic Tilts  2 x 10    Bridging  2 x 10            Hooklying with Knee Ext  2 x 10    Trunk Rotation Exercise  2 x 10    Seated Ball Roll Outs - 3 way  5 with 5 second hold each direction   Ball- Knee Extension    Planks    Quadruped             Patient received Manual Treatment x 10 Minutes to include :   SKTC  SKTC  Piriformis Stretch     neuromuscular re-education  activities to improve: Coordination, Kinesthetic, Sense, Proprioception, and Posture for 0 minutes. The following activities were included:          Bridging with Abduction  2 x 10 with green band   Hooklying with Marching  2 x 10 (B) with green band    Long bridging over yellow SB 10x5sh   Sciatic nerve glides (Seated) 10x     therapeutic activities to improve functional performance for - 15 minutes, including:    Cybex Back Extension 3 x 10 with 2 plates   Cybex Hip Abduction 2 x 10 with 2 plates (B)  Cybex Rows - Close 2 x 10 with 3 plates   Cybex Rows - Wide 2 x 10 with 3 plates       Home Exercises Provided and Patient Education Provided     Education provided: Therapist initiated the patient's Home Exercise Program 07/9/2024. Patient was given a written copy of the Home Exercise Program with pictures and written instructions for each exercise.  Instructed patient on proper form for all exercises and had patient return demonstration.  Added sciatic nerve flossing on 7/15/2024.     Written Home Exercises Provided: yes.  Exercises were reviewed and Mervat was able to demonstrate them prior to the end of the session.  Mervat demonstrated good  understanding of the education provided.     See EMR under Patient Instructions for exercises provided 7/9/2024.    Assessment     Patient reports she has been lifting some things at work and this has caused some increased pain and tightness in the Right side of her low back. Therapist held the Cybex Leg Press today due to increased tightness in low back. Therapist performed manual stretching today to help decrease spasm in the low back. Instructed patient on how to use a towel to assist self stretch for single knee to chest and supine piriformis stretch. She reports this did help with her current pain. We plan to progress her as tolerated. Sup Visit performed today with SIMIN Vela and SIMIN Grewal.  All goals and treatment plan reviewed. Will work toward  completion of all goals set.          [facility] at time of Evaluation :   Mervat is a 58 y.o. female referred to outpatient Physical Therapy with a medical diagnosis of Lumbar Radiculopathy. Mervat reports: she has been having back pain for several years but the pain has increased significantly over the past 3 months. She has seen a chiropractor and this usually helps some but it is no longer helping. She reports her pain is all the way across the low back with the worst pain traveling to her Right buttock and down the Right Lower Extremity all the way to the foot. She occasionally has pain in the bottom of her foot as well. She denies numbness or tingling at this time. X Rays do show 2mm anterolisthesis of L4 on L5 and Moderate facet degeneration with sclerosis and osteophytes L3-4 through L5-S1. No MRI has been performed at this time.  MD ordered Outpatient Physical Therapy and patient is here today for the Evaluation.   Patient presents with decreased lumbosacral mobility in all directions with forward flexion being the most painful (likely due to the anterolisthesis). She also has pain with Right side bending and Right rotation. Extension is not overly painful unless she extends past 10 degrees (likely due to the facet degeneration and osteophytes). Patient has tightness in bilateral hamstrings and piriformis with Right worse than Left. Unsure if the sciatic pattern pain is from nerve root compression in the spine or more peripherally due to tightness of the piriformis and hamstrings. We will address both in therapy.    Therapist initiated the Basic Home Stretching program today that includes Low Back Stretch, Single Knee to Chest, Double Knee to Chest, Piriformis Stretch, and Hamstring Stretch. We will establish a Home Exercise Program over the next few visits based on her pain tolerance.   Patient will require Physical Therapy Intervention to address all deficits and work toward completion of all goals set.  Therapist will refer patient back to MD upon completion of Therapy for further assessment and possible MRI if pain and dysfunction persist.       Mervat Is progressing well towards her goals.   Pt prognosis is Good.     Pt will continue to benefit from skilled outpatient physical therapy to address the deficits listed in the problem list box on initial evaluation, provide pt/family education and to maximize pt's level of independence in the home and community environment.     Pt's spiritual, cultural and educational needs considered and pt agreeable to plan of care and goals.     Anticipated Barriers for therapy: anterolisthesis     Goals:  Short Term Goals: 4 weeks   1. Patient will be Independent with Home Exercise Program   2. Patient will demonstrate with improved Posture and Body Mechanics  3. Patient will increase Lumbosacral Range of Motion by 25%   4. Patient will increase Lower Extremity and Core Strength to grossly 4+/5  5. Patient will decrease complaints of pain with activity to Less than or Equal to 5/10      Long Term Goals: 8 weeks   1. Patient will tolerate 30 minutes of activity with complaints of Pain at Less Than or Equal to 4/10      Plan     Plan of care Certification: 6/26/2024 to 8/23/2024.     Outpatient Physical Therapy 2 times weekly for 8 weeks to include the following interventions: 92062 [therapeutic exercise], 06604 [neuromuscular re-education], 52498 [manual therapy], 93528 [therapeutic activities], 88770 [unattended electrical stimulation], and 96434 [mechanical traction]    CUCA COLUNGA, PT, DPT   7/24/2024

## 2024-07-29 ENCOUNTER — CLINICAL SUPPORT (OUTPATIENT)
Dept: REHABILITATION | Facility: HOSPITAL | Age: 59
End: 2024-07-29
Payer: COMMERCIAL

## 2024-07-29 DIAGNOSIS — M54.16 LUMBAR RADICULOPATHY: Primary | ICD-10-CM

## 2024-07-29 PROCEDURE — 97110 THERAPEUTIC EXERCISES: CPT | Mod: CQ

## 2024-07-29 PROCEDURE — 97530 THERAPEUTIC ACTIVITIES: CPT | Mod: CQ

## 2024-07-29 NOTE — PROGRESS NOTES
[facility]   Physical Therapy Treatment Note     Name: Mervat Godwin  Clinic Number: [id number]    Therapy Diagnosis: Lumbar back pain with radiculopathy affecting right lower extremity   Physician: Terra Larson FNP    Visit Date: 7/29/2024    Physician Orders: PT Eval and Treat   Medical Diagnosis from Referral: Low Back Pain with Radiculopathy   Evaluation Date: 6/26/2024  Authorization Period Expiration: 9/24/2024   Plan of Care Expiration: 8/23/2024  Visit # / Visits authorized: 6/11  FOTO: 51/100    PTA Visit #: 1/5    Time In: 4:50 pm   Time Out: 5:30  pm   Total Billable Time: 24 individual minutes due to time spent one on one with Patient     Precautions: 2mm anterolisthesis of L4 on L5 and Moderate facet degeneration with sclerosis and osteophytes L3-4 through L5-S1.  Functional Level at time of  Evaluation: Pain with prolonged standing and walking; Pain with forward flexion    Subjective     Pt reports: she has some pain across her lower back today.   She was compliant with home exercise program.    Pain: 3 /10  Location: Back and Right Lower Extremity       Objective       Mervat received therapeutic exercises to develop strength, endurance, ROM, flexibility, posture, and core stabilization for 14 minutes including:    Back Exercises    Bike              5 Min    Calf Stretch 3 x 20 second hold    Hamstring Stretch  4 x 15 sec hold    Seated piriformis stretch  2 x 20 second hold (B)       Cybex hamstring curls  4 plates x 20    Cybex bilateral leg press  7 plates x 30       Pelvic Tilts  2 x 10    Bridging  2 x 10            Hooklying with Knee Ext  2 x 10    Trunk Rotation Exercise  2 x 10    Seated Ball Roll Outs - 3 way  5 with 5 second hold each direction   Ball- Knee Extension    Planks    Quadruped             Patient received Manual Treatment x 0 Minutes to include :   SKTC  SKTC  Piriformis Stretch     neuromuscular re-education activities to improve:  Coordination, Kinesthetic, Sense, Proprioception, and Posture for 0 minutes. The following activities were included:    Bridging with Abduction  2 x 10 with green band   Hooklying with Marching  2 x 10 (B) with green band    Long bridging over yellow SB 10x5sh   Sciatic nerve glides (Seated) 10x     therapeutic activities to improve functional performance for - 10 minutes, including:    Cybex Back Extension 3 x 10 with 2 plates   Cybex Hip Abduction 2 x 10 with 2 plates (B)  Cybex Rows - Close 2 x 10 with 3 plates   Cybex Rows - Wide 2 x 10 with 3 plates       Home Exercises Provided and Patient Education Provided     Education provided: Therapist initiated the patient's Home Exercise Program 07/9/2024. Patient was given a written copy of the Home Exercise Program with pictures and written instructions for each exercise.  Instructed patient on proper form for all exercises and had patient return demonstration.  Added sciatic nerve flossing on 7/15/2024.     Written Home Exercises Provided: yes.  Exercises were reviewed and Mervat was able to demonstrate them prior to the end of the session.  Mervat demonstrated good  understanding of the education provided.     See EMR under Patient Instructions for exercises provided 7/9/2024.    Assessment     Patient reports she still is having low back pain but it has eased up some since she has been coming to therapy. Continued with postural and core strengthening/stabilization today and Patient did well. No reports of increased pain during tx. Will work toward completion of all goals set. Time billed reflects time spent one on one with Patient.       [facility] at time of Evaluation :   Mervat is a 58 y.o. female referred to outpatient Physical Therapy with a medical diagnosis of Lumbar Radiculopathy. Mervat reports: she has been having back pain for several years but the pain has increased significantly over the past 3 months. She has seen a chiropractor and this usually helps some  but it is no longer helping. She reports her pain is all the way across the low back with the worst pain traveling to her Right buttock and down the Right Lower Extremity all the way to the foot. She occasionally has pain in the bottom of her foot as well. She denies numbness or tingling at this time. X Rays do show 2mm anterolisthesis of L4 on L5 and Moderate facet degeneration with sclerosis and osteophytes L3-4 through L5-S1. No MRI has been performed at this time.  MD ordered Outpatient Physical Therapy and patient is here today for the Evaluation.   Patient presents with decreased lumbosacral mobility in all directions with forward flexion being the most painful (likely due to the anterolisthesis). She also has pain with Right side bending and Right rotation. Extension is not overly painful unless she extends past 10 degrees (likely due to the facet degeneration and osteophytes). Patient has tightness in bilateral hamstrings and piriformis with Right worse than Left. Unsure if the sciatic pattern pain is from nerve root compression in the spine or more peripherally due to tightness of the piriformis and hamstrings. We will address both in therapy.    Therapist initiated the Basic Home Stretching program today that includes Low Back Stretch, Single Knee to Chest, Double Knee to Chest, Piriformis Stretch, and Hamstring Stretch. We will establish a Home Exercise Program over the next few visits based on her pain tolerance.   Patient will require Physical Therapy Intervention to address all deficits and work toward completion of all goals set. Therapist will refer patient back to MD upon completion of Therapy for further assessment and possible MRI if pain and dysfunction persist.       Mervat Is progressing well towards her goals.   Pt prognosis is Good.     Pt will continue to benefit from skilled outpatient physical therapy to address the deficits listed in the problem list box on initial evaluation, provide  pt/family education and to maximize pt's level of independence in the home and community environment.     Pt's spiritual, cultural and educational needs considered and pt agreeable to plan of care and goals.     Anticipated Barriers for therapy: anterolisthesis     Goals:  Short Term Goals: 4 weeks   1. Patient will be Independent with Home Exercise Program   2. Patient will demonstrate with improved Posture and Body Mechanics  3. Patient will increase Lumbosacral Range of Motion by 25%   4. Patient will increase Lower Extremity and Core Strength to grossly 4+/5  5. Patient will decrease complaints of pain with activity to Less than or Equal to 5/10      Long Term Goals: 8 weeks   1. Patient will tolerate 30 minutes of activity with complaints of Pain at Less Than or Equal to 4/10      Plan     Plan of care Certification: 6/26/2024 to 8/23/2024.     Outpatient Physical Therapy 2 times weekly for 8 weeks to include the following interventions: 60093 [therapeutic exercise], 80468 [neuromuscular re-education], 54270 [manual therapy], 59624 [therapeutic activities], 38178 [unattended electrical stimulation], and 42303 [mechanical traction]    Valentin Collins PTA,  7/29/2024

## 2024-08-05 ENCOUNTER — CLINICAL SUPPORT (OUTPATIENT)
Dept: REHABILITATION | Facility: HOSPITAL | Age: 59
End: 2024-08-05
Payer: COMMERCIAL

## 2024-08-05 DIAGNOSIS — M54.16 LUMBAR RADICULOPATHY: Primary | ICD-10-CM

## 2024-08-05 PROCEDURE — 97110 THERAPEUTIC EXERCISES: CPT

## 2024-08-05 PROCEDURE — 97530 THERAPEUTIC ACTIVITIES: CPT

## 2024-08-07 ENCOUNTER — CLINICAL SUPPORT (OUTPATIENT)
Dept: REHABILITATION | Facility: HOSPITAL | Age: 59
End: 2024-08-07
Payer: COMMERCIAL

## 2024-08-07 DIAGNOSIS — M54.16 LUMBAR RADICULOPATHY: Primary | ICD-10-CM

## 2024-08-07 PROCEDURE — 97530 THERAPEUTIC ACTIVITIES: CPT

## 2024-08-07 PROCEDURE — 97110 THERAPEUTIC EXERCISES: CPT

## 2024-08-12 ENCOUNTER — CLINICAL SUPPORT (OUTPATIENT)
Dept: REHABILITATION | Facility: HOSPITAL | Age: 59
End: 2024-08-12
Payer: COMMERCIAL

## 2024-08-12 DIAGNOSIS — M54.16 LUMBAR RADICULOPATHY: Primary | ICD-10-CM

## 2024-08-12 PROCEDURE — 97110 THERAPEUTIC EXERCISES: CPT | Mod: CQ

## 2024-08-12 PROCEDURE — 97530 THERAPEUTIC ACTIVITIES: CPT | Mod: CQ

## 2024-08-12 NOTE — PROGRESS NOTES
[facility]   Physical Therapy Treatment Note     Name: Mervat Godwin  Clinic Number: [id number]    Therapy Diagnosis: Lumbar back pain with radiculopathy affecting right lower extremity   Physician: Terra Larson FNP    Visit Date: 8/12/2024    Physician Orders: PT Eval and Treat   Medical Diagnosis from Referral: Low Back Pain with Radiculopathy   Evaluation Date: 6/26/2024  Authorization Period Expiration: 9/24/2024   Plan of Care Expiration: 8/23/2024  Visit # / Visits authorized: 10/11  FOTO: 51/100    PTA Visit #: 1/5    Time In: 4:52 pm   Time Out: 5:22 pm   Total Billable Time: 30 individual minutes     Precautions: 2mm anterolisthesis of L4 on L5 and Moderate facet degeneration with sclerosis and osteophytes L3-4 through L5-S1.  Functional Level at time of  Evaluation: Pain with prolonged standing and walking; Pain with forward flexion    Subjective     Pt reports: she is feeling pretty good today other than some soreness across the lower back from helping her daughter pack up and move. She is ready to discharge today.   She was compliant with home exercise program.    Pain: 1-2 /10  Location: Back and Right Lower Extremity       Objective       Mervat received therapeutic exercises to develop strength, endurance, ROM, flexibility, posture, and core stabilization for 15 minutes including:    Back Exercises    Bike              5 Min    Calf Stretch 3 x 20 second hold    Hamstring Stretch  4 x 15 sec hold    Seated piriformis stretch  2 x 20 second hold (B)       Cybex hamstring curls  4 plates x 20    Cybex bilateral leg press  7 plates x 30       Pelvic Tilts  2 x 10 (not performed today)    Bridging  2 x 10 (not performed today)            Hooklying with Knee Ext  2 x 10 (not performed today)    Trunk Rotation Exercise  2 x 10 (not performed today)    Seated Ball Roll Outs - 3 way  5 with 5 second hold each direction   Ball- Knee Extension    Planks    Quadruped              Patient received Manual Treatment x 0 Minutes to include :   SKTC  SKTC  Piriformis Stretch     neuromuscular re-education activities to improve: Coordination, Kinesthetic, Sense, Proprioception, and Posture for 0 minutes. The following activities were included:    Bridging with Abduction  2 x 10 with green band   Hooklying with Marching  2 x 10 (B) with green band    Long bridging over yellow SB 10x5sh   Sciatic nerve glides (Seated) 10x     therapeutic activities to improve functional performance for - 15 minutes, including:    Cybex Back Extension 3 x 10 with 2 plates  Cybex Hip Abduction 2 x 10 with 2 plates (B)  Cybex Rows - Close 2 x 10 with 3 plates   Cybex Rows - Wide 2 x 10 with 3 plates   Trunk Rotation - Green Theraband 2 x 10 (B)    Home Exercises Provided and Patient Education Provided     Education provided: Therapist initiated the patient's Home Exercise Program 07/9/2024. Patient was given a written copy of the Home Exercise Program with pictures and written instructions for each exercise.  Instructed patient on proper form for all exercises and had patient return demonstration.  Added sciatic nerve flossing on 7/15/2024.     Written Home Exercises Provided: yes.  Exercises were reviewed and Mervat was able to demonstrate them prior to the end of the session.  Mervat demonstrated good  understanding of the education provided.     See EMR under Patient Instructions for exercises provided 7/9/2024.    Assessment     Patient reports back pain is ok today - some soreness from helping her daughter pack up to move over the weekend. Therapist has had her perform mostly Cybex machines to get her ready to join the gym. She did very well with this with no increase in pain following this treatment. Therefore, today we continued with the Cybex machines again today. She has definitely shown good increase in overall core and Lower Extremity strength. Her lumbosacral mobility has also improved. She reports her  pain still fluctuates day to day depending on her activity but overall she is feeling better. She is scheduled to see referring provider - DINESH Field on 9/9 for follow up. FOTO 51/100 on evaluation (6/26) and 54 /100 at discharge today.  At this time Patient is being discharged from therapy with all goals being met and referral back to MD.     [facility] at time of Evaluation :   Mervat is a 58 y.o. female referred to outpatient Physical Therapy with a medical diagnosis of Lumbar Radiculopathy. Mervat reports: she has been having back pain for several years but the pain has increased significantly over the past 3 months. She has seen a chiropractor and this usually helps some but it is no longer helping. She reports her pain is all the way across the low back with the worst pain traveling to her Right buttock and down the Right Lower Extremity all the way to the foot. She occasionally has pain in the bottom of her foot as well. She denies numbness or tingling at this time. X Rays do show 2mm anterolisthesis of L4 on L5 and Moderate facet degeneration with sclerosis and osteophytes L3-4 through L5-S1. No MRI has been performed at this time.  MD ordered Outpatient Physical Therapy and patient is here today for the Evaluation.   Patient presents with decreased lumbosacral mobility in all directions with forward flexion being the most painful (likely due to the anterolisthesis). She also has pain with Right side bending and Right rotation. Extension is not overly painful unless she extends past 10 degrees (likely due to the facet degeneration and osteophytes). Patient has tightness in bilateral hamstrings and piriformis with Right worse than Left. Unsure if the sciatic pattern pain is from nerve root compression in the spine or more peripherally due to tightness of the piriformis and hamstrings. We will address both in therapy.    Therapist initiated the Basic Home Stretching program today that includes Low Back Stretch,  Single Knee to Chest, Double Knee to Chest, Piriformis Stretch, and Hamstring Stretch. We will establish a Home Exercise Program over the next few visits based on her pain tolerance.   Patient will require Physical Therapy Intervention to address all deficits and work toward completion of all goals set. Therapist will refer patient back to MD upon completion of Therapy for further assessment and possible MRI if pain and dysfunction persist.       Mervat Is progressing well towards her goals.   Pt prognosis is Good.     Pt will continue to benefit from skilled outpatient physical therapy to address the deficits listed in the problem list box on initial evaluation, provide pt/family education and to maximize pt's level of independence in the home and community environment.     Pt's spiritual, cultural and educational needs considered and pt agreeable to plan of care and goals.     Anticipated Barriers for therapy: anterolisthesis     Goals:  Short Term Goals: 4 weeks   1. Patient will be Independent with Home Exercise Program  2. Patient will demonstrate with improved Posture and Body Mechanics  3. Patient will increase Lumbosacral Range of Motion by 25%   4. Patient will increase Lower Extremity and Core Strength to grossly 4+/5  5. Patient will decrease complaints of pain with activity to Less than or Equal to 5/10      Long Term Goals: 8 weeks   1. Patient will tolerate 30 minutes of activity with complaints of Pain at Less Than or Equal to 4/10      Plan     Plan of care Certification: 6/26/2024 to 8/23/2024.     Outpatient Physical Therapy 2 times weekly for 8 weeks to include the following interventions: 57679 [therapeutic exercise], 43497 [neuromuscular re-education], 64861 [manual therapy], 17010 [therapeutic activities], 08483 [unattended electrical stimulation], and 35122 [mechanical traction]    Valentin Collins PTA  8/12/2024

## 2024-08-18 PROBLEM — M54.16 LUMBAR RADICULOPATHY: Status: RESOLVED | Noted: 2024-06-27 | Resolved: 2024-08-18

## 2024-09-09 ENCOUNTER — OFFICE VISIT (OUTPATIENT)
Dept: FAMILY MEDICINE | Facility: CLINIC | Age: 59
End: 2024-09-09
Payer: COMMERCIAL

## 2024-09-09 VITALS
RESPIRATION RATE: 18 BRPM | WEIGHT: 203.38 LBS | DIASTOLIC BLOOD PRESSURE: 77 MMHG | TEMPERATURE: 98 F | HEIGHT: 66 IN | HEART RATE: 69 BPM | BODY MASS INDEX: 32.68 KG/M2 | OXYGEN SATURATION: 98 % | SYSTOLIC BLOOD PRESSURE: 118 MMHG

## 2024-09-09 DIAGNOSIS — Z23 NEED FOR INFLUENZA VACCINATION: Primary | ICD-10-CM

## 2024-09-09 DIAGNOSIS — R73.03 PREDIABETES: ICD-10-CM

## 2024-09-09 LAB
EST. AVERAGE GLUCOSE BLD GHB EST-MCNC: 120 MG/DL
HBA1C MFR BLD HPLC: 5.8 % (ref 4.5–6.6)

## 2024-09-09 PROCEDURE — 90656 IIV3 VACC NO PRSV 0.5 ML IM: CPT | Mod: ,,, | Performed by: NURSE PRACTITIONER

## 2024-09-09 PROCEDURE — 3008F BODY MASS INDEX DOCD: CPT | Mod: CPTII,,, | Performed by: NURSE PRACTITIONER

## 2024-09-09 PROCEDURE — 1159F MED LIST DOCD IN RCRD: CPT | Mod: CPTII,,, | Performed by: NURSE PRACTITIONER

## 2024-09-09 PROCEDURE — 90471 IMMUNIZATION ADMIN: CPT | Mod: ,,, | Performed by: NURSE PRACTITIONER

## 2024-09-09 PROCEDURE — 3078F DIAST BP <80 MM HG: CPT | Mod: CPTII,,, | Performed by: NURSE PRACTITIONER

## 2024-09-09 PROCEDURE — 3044F HG A1C LEVEL LT 7.0%: CPT | Mod: CPTII,,, | Performed by: NURSE PRACTITIONER

## 2024-09-09 PROCEDURE — 3074F SYST BP LT 130 MM HG: CPT | Mod: CPTII,,, | Performed by: NURSE PRACTITIONER

## 2024-09-09 PROCEDURE — 83036 HEMOGLOBIN GLYCOSYLATED A1C: CPT | Mod: ,,, | Performed by: CLINICAL MEDICAL LABORATORY

## 2024-09-09 PROCEDURE — 99213 OFFICE O/P EST LOW 20 MIN: CPT | Mod: 25,,, | Performed by: NURSE PRACTITIONER

## 2024-09-09 RX ORDER — METFORMIN HYDROCHLORIDE 500 MG/1
500 TABLET, EXTENDED RELEASE ORAL
Qty: 90 TABLET | Refills: 3 | Status: SHIPPED | OUTPATIENT
Start: 2024-09-09 | End: 2025-09-09

## 2024-09-09 RX ORDER — MULTIVITAMIN
1 TABLET ORAL DAILY
COMMUNITY

## 2024-09-09 NOTE — PROGRESS NOTES
DINESH Field   [facility]  [address]   [place] [address]     PATIENT NAME: Mervat Godwin  : 1965  DATE: 24  MRN: [id number]      Billing Provider: DINESH Field  Level of Service:   Patient PCP Information       Provider PCP Type    DINESH Field General            Reason for Visit / Chief Complaint: 3 month follow up         History of Present Illness / Problem Focused Workflow     Mervat Godwin is a 58 y.o. female presents to the clinic  for 3 month prediabetes check up. She is taking Metfromin 500mg daily and has been trying to exercise regularly but has not lost any weight. She is watching her  diet closely.  She conitnues to work  at [employer].   She has completed PT for her back pain which did help some and is continuing exercises at home.    She saw her rheumatologist at [facility] Dr Kinjal Lowry for her sjogren syndrome and continues to use her lubricant eye drops (Rhoto) and uses prednisone drops prn for  inflammation.  She is also on Plaquenil.    She is having  a little vaginal odor and does noted that the clear cream she had with last BV did not work as well. She is on probiotics.       Review of Systems     Review of Systems   Constitutional:  Negative for fatigue.   HENT:  Negative for nasal congestion and sore throat.    Eyes:  Positive for eye dryness.   Respiratory:  Negative for cough, chest tightness and shortness of breath.    Cardiovascular:  Negative for chest pain, palpitations and leg swelling.   Gastrointestinal:  Negative for nausea, vomiting and reflux.   Neurological:  Negative for weakness and memory loss.   Psychiatric/Behavioral:  Negative for confusion and sleep disturbance.         Medical / Social / Family History     Past Medical History:   Diagnosis Date    Hyperlipidemia     Influenza B 2022    Sjogren's disease        Past Surgical History:   Procedure Laterality Date    HYSTERECTOMY      OOPHORECTOMY  "        Social History  Ms.  reports that she has never smoked. She has never been exposed to tobacco smoke. She has never used smokeless tobacco. She reports that she does not drink alcohol and does not use drugs.    Family History  Ms.'s family history includes Breast cancer in her maternal grandmother.    Medications and Allergies     Medications  Outpatient Medications Marked as Taking for the 9/9/24 encounter (Office Visit) with Terra Larson FNP   Medication Sig Dispense Refill    ascorbic acid, vitamin C, (VITAMIN C) 500 MG tablet Take 500 mg by mouth once daily.      atorvastatin (LIPITOR) 20 MG tablet Take 1 tablet (20 mg total) by mouth nightly. 90 tablet 3    cyanocobalamin (VITAMIN B-12) 1000 MCG tablet Take 100 mcg by mouth once daily.      difluprednate (DUREZOL) 0.05 % Drop ophthalmic solution Place 1 drop into both eyes 2 (two) times daily.      hydrOXYchloroQUINE (PLAQUENIL) 200 mg tablet Take 200 mg by mouth 2 (two) times daily.      Lactobacillus rhamnosus GG (CULTURELLE) 10 billion cell capsule Take 1 capsule by mouth once daily.      metFORMIN (GLUCOPHAGE-XR) 500 MG ER 24hr tablet Take 1 tablet (500 mg total) by mouth daily with breakfast. 90 tablet 0    multivit with min-folic acid 0.4 mg Tab Take 1 tablet by mouth Daily.      prednisoLONE acetate (PRED FORTE) 1 % DrpS Place 1 drop into both eyes 4 (four) times daily.      vitamin D (VITAMIN D3) 1000 units Tab 1 tablet Orally Once a day         Allergies  Review of patient's allergies indicates:  No Known Allergies    Physical Examination     Vitals:    09/09/24 1035   BP: 118/77   Pulse: 69   Resp: 18   Temp: 98 °F (36.7 °C)   TempSrc: Oral   SpO2: 98%   Weight: 92.3 kg (203 lb 6.4 oz)   Height: 5' 6" (1.676 m)      Physical Exam  Constitutional:       Appearance: Normal appearance.   Cardiovascular:      Rate and Rhythm: Normal rate and regular rhythm.      Pulses: Normal pulses.      Heart sounds: Normal heart sounds.   Pulmonary:      " Effort: Pulmonary effort is normal.      Breath sounds: Normal breath sounds.   Musculoskeletal:      Right lower leg: No edema.      Left lower leg: No edema.   Skin:     General: Skin is warm and dry.   Neurological:      Mental Status: She is alert and oriented to person, place, and time.   Psychiatric:         Mood and Affect: Mood normal.         Behavior: Behavior normal.          Assessment and Plan (including Health Maintenance)     :    Plan:  will check A1c today and  notify pt via My chart  of results.  Encouraged to continue diet and needs 30 min of exercise daily.   Pt will get flu shot today and encouraged her to get covid vaccine as well as shingles vaccine at local pharmacy.         Health Maintenance Due   Topic Date Due    HIV Screening  Never done    Shingles Vaccine (1 of 2) Never done    Influenza Vaccine (1) 09/01/2024    COVID-19 Vaccine (5 - 2023-24 season) 09/01/2024    Mammogram  10/30/2024       Problem List Items Addressed This Visit          Endocrine    Prediabetes   .  Prediabetes       Health Maintenance Topics with due status: Not Due       Topic Last Completion Date    Colorectal Cancer Screening 01/15/2018    TETANUS VACCINE 09/18/2023    Hemoglobin A1c (Prediabetes) 05/03/2024    Lipid Panel 05/03/2024       Procedures     Future Appointments   Date Time Provider Department Center   11/6/2024  1:00 PM [facility]   12/9/2024 10:30 [facility] [place]   5/8/2025  9:00 AM Terra Larson FNP [facility] [place]        No follow-ups on file.     Signature:  DINESH Field    Date of encounter: 9/9/24

## 2024-09-10 NOTE — PROGRESS NOTES
Mervat,   Your A1c is 5.8 and improved.  Keep working one it with diet, exercise and metformin once daily.   Terra HUTSONP

## 2024-10-24 ENCOUNTER — PATIENT MESSAGE (OUTPATIENT)
Dept: GASTROENTEROLOGY | Facility: CLINIC | Age: 59
End: 2024-10-24

## 2024-11-04 ENCOUNTER — HOSPITAL ENCOUNTER (OUTPATIENT)
Dept: RADIOLOGY | Facility: HOSPITAL | Age: 59
Discharge: HOME | End: 2024-11-04
Attending: NURSE PRACTITIONER
Payer: COMMERCIAL

## 2024-11-04 VITALS — WEIGHT: 202 LBS | HEIGHT: 66 IN | BODY MASS INDEX: 32.47 KG/M2

## 2024-11-04 DIAGNOSIS — Z12.31 ENCOUNTER FOR SCREENING MAMMOGRAM FOR MALIGNANT NEOPLASM OF BREAST: ICD-10-CM

## 2024-11-04 PROCEDURE — 77067 SCR MAMMO BI INCL CAD: CPT | Mod: 26,,, | Performed by: RADIOLOGY

## 2024-11-04 PROCEDURE — 77063 BREAST TOMOSYNTHESIS BI: CPT | Mod: 26,,, | Performed by: RADIOLOGY

## 2024-11-04 PROCEDURE — 77063 BREAST TOMOSYNTHESIS BI: CPT | Mod: TC

## 2024-12-30 ENCOUNTER — OFFICE VISIT (OUTPATIENT)
Dept: FAMILY MEDICINE | Facility: CLINIC | Age: 59
End: 2024-12-30
Payer: COMMERCIAL

## 2024-12-30 VITALS
RESPIRATION RATE: 18 BRPM | BODY MASS INDEX: 32.36 KG/M2 | HEIGHT: 66 IN | DIASTOLIC BLOOD PRESSURE: 82 MMHG | HEART RATE: 80 BPM | TEMPERATURE: 98 F | SYSTOLIC BLOOD PRESSURE: 122 MMHG | WEIGHT: 201.38 LBS | OXYGEN SATURATION: 99 %

## 2024-12-30 DIAGNOSIS — M35.01 SJOGREN'S SYNDROME WITH KERATOCONJUNCTIVITIS SICCA: Chronic | ICD-10-CM

## 2024-12-30 DIAGNOSIS — R73.03 PREDIABETES: Primary | ICD-10-CM

## 2024-12-30 DIAGNOSIS — M54.16 LUMBAR BACK PAIN WITH RADICULOPATHY AFFECTING RIGHT LOWER EXTREMITY: Chronic | ICD-10-CM

## 2024-12-30 LAB
EST. AVERAGE GLUCOSE BLD GHB EST-MCNC: 126 MG/DL
HBA1C MFR BLD HPLC: 6 %

## 2024-12-30 PROCEDURE — 3044F HG A1C LEVEL LT 7.0%: CPT | Mod: CPTII,,, | Performed by: NURSE PRACTITIONER

## 2024-12-30 PROCEDURE — 83036 HEMOGLOBIN GLYCOSYLATED A1C: CPT | Mod: ,,, | Performed by: CLINICAL MEDICAL LABORATORY

## 2024-12-30 PROCEDURE — 3079F DIAST BP 80-89 MM HG: CPT | Mod: CPTII,,, | Performed by: NURSE PRACTITIONER

## 2024-12-30 PROCEDURE — 3008F BODY MASS INDEX DOCD: CPT | Mod: CPTII,,, | Performed by: NURSE PRACTITIONER

## 2024-12-30 PROCEDURE — 3074F SYST BP LT 130 MM HG: CPT | Mod: CPTII,,, | Performed by: NURSE PRACTITIONER

## 2024-12-30 PROCEDURE — 1159F MED LIST DOCD IN RCRD: CPT | Mod: CPTII,,, | Performed by: NURSE PRACTITIONER

## 2024-12-30 PROCEDURE — 99214 OFFICE O/P EST MOD 30 MIN: CPT | Mod: ,,, | Performed by: NURSE PRACTITIONER

## 2024-12-30 RX ORDER — GABAPENTIN 100 MG/1
CAPSULE ORAL
Qty: 90 CAPSULE | Refills: 2 | Status: SHIPPED | OUTPATIENT
Start: 2024-12-30

## 2024-12-30 RX ORDER — DEXAMETHASONE SODIUM PHOSPHATE 1 MG/ML
1 SOLUTION/ DROPS OPHTHALMIC 3 TIMES DAILY
COMMUNITY
Start: 2024-11-07

## 2024-12-30 RX ORDER — BUTYROSPERMUM PARKII(SHEA BUTTER), SIMMONDSIA CHINENSIS (JOJOBA) SEED OIL, ALOE BARBADENSIS LEAF EXTRACT .01; 1; 3.5 G/100G; G/100G; G/100G
250 LIQUID TOPICAL DAILY
COMMUNITY

## 2024-12-30 NOTE — PROGRESS NOTES
DINESH Field   [facility]  [address]   [place] [address]     PATIENT NAME: Mervat Godwin  : 1965  DATE: 24  MRN: [id number]      Billing Provider: DINESH Field  Level of Service: SC OFFICE/OUTPT VISIT, EST, LEVL IV, 30-39 MIN  Patient PCP Information       Provider PCP Type    DINESH Field General              Reason for Visit / Chief Complaint: Diabetes (3 month follow up), Cough, Nasal Congestion (2025, started having sore throat, productive cough and a runny nose. Also reports having diarrhea and vomiting. ), and Leg Pain (Right leg pain when she lays down at night. )       History of Present Illness / Problem Focused Workflow     History of Present Illness    CHIEF COMPLAINT:  Patient presents today for a diabetes checkup.    DIABETES:  She takes Metformin daily. A1C was 5.8 on 2024, indicating pre-diabetes, which has improved from her highest level eight months ago. Despite better eating habits and increased exercise, she reports no significant weight loss.    RECENT ILLNESS:  She reports recent upper respiratory symptoms including cough and clear nasal congestion. She also experienced a recent gastroenteritis episode with vomiting and diarrhea lasting three days. Symptoms improved with oral rehydration using Pedialyte and Gatorade. She denies current abdominal pain.    MUSCULOSKELETAL:  She experiences right leg discomfort at night, possibly related to sciatica, primarily affecting the calf area. She reports occasional tingling. She has a history of chronic back problems with previous history of injections.    RHEUMATOLOGIC:  She has Sjogren's syndrome managed with Plaquenil under the care of rheumatologist Dr. Kinjal Lowry at [facility]. She maintains regular ophthalmology follow-up for Sjogren's-related ocular symptoms.      ROS:  General: -fever, -chills, -fatigue, -weight gain, -weight loss  Eyes: -vision changes, -redness, -discharge  ENT: -ear  pain, +nasal congestion, -sore throat  Cardiovascular: -chest pain, -palpitations, -lower extremity edema  Respiratory: +cough, -shortness of breath  Gastrointestinal: -abdominal pain, -nausea, +vomiting, +diarrhea, -constipation, -blood in stool  Genitourinary: -dysuria, -hematuria, -frequency  Musculoskeletal: -joint pain, -muscle pain  Skin: -rash, -lesion  Neurological: -headache, -dizziness, -numbness, -tingling  Psychiatric: -anxiety, -depression, -sleep difficulty           Medical / Social / Family History     Past Medical History:   Diagnosis Date    Hyperlipidemia     Influenza B 2/22/2022    Sjogren's disease        Past Surgical History:   Procedure Laterality Date    HYSTERECTOMY      OOPHORECTOMY         Social History  Ms.  reports that she has never smoked. She has never been exposed to tobacco smoke. She has never used smokeless tobacco. She reports that she does not drink alcohol and does not use drugs.    Family History  Ms.'s family history includes Breast cancer in her maternal grandmother.    Medications and Allergies     Medications  Outpatient Medications Marked as Taking for the 12/30/24 encounter (Office Visit) with Terra Larson FNP   Medication Sig Dispense Refill    ascorbic acid, vitamin C, (VITAMIN C) 500 MG tablet Take 500 mg by mouth once daily.      atorvastatin (LIPITOR) 20 MG tablet Take 1 tablet (20 mg total) by mouth nightly. 90 tablet 3    cyanocobalamin (VITAMIN B-12) 1000 MCG tablet Take 100 mcg by mouth once daily.      dexAMETHasone (DECADRON) 0.1 % ophthalmic solution Place 1 drop into both eyes 3 (three) times daily.      difluprednate (DUREZOL) 0.05 % Drop ophthalmic solution Place 1 drop into both eyes 2 (two) times daily.      hydrOXYchloroQUINE (PLAQUENIL) 200 mg tablet Take 200 mg by mouth 2 (two) times daily.      metFORMIN (GLUCOPHAGE-XR) 500 MG ER 24hr tablet Take 1 tablet (500 mg total) by mouth daily with breakfast. 90 tablet 3    multivit with min-folic acid 0.4  "mg Tab Take 1 tablet by mouth Daily.      prednisoLONE acetate (PRED FORTE) 1 % DrpS Place 1 drop into both eyes 4 (four) times daily.      Saccharomyces boulardii (FLORASTOR) 250 mg capsule Take 250 mg by mouth Daily.      vitamin D (VITAMIN D3) 1000 units Tab 1 tablet Orally Once a day         Allergies  Review of patient's allergies indicates:  No Known Allergies    Physical Examination     Vitals:    12/30/24 0901   BP: 122/82   Pulse: 80   Resp: 18   Temp: 98.2 °F (36.8 °C)   TempSrc: Oral   SpO2: 99%   Weight: 91.4 kg (201 lb 6.4 oz)   Height: 5' 6" (1.676 m)        Physical Exam    General: No acute distress. Well-developed. Well-nourished.  Eyes: EOMI. Sclerae anicteric.  HENT: Normocephalic. Atraumatic. Nares patent. Moist oral mucosa. No pharyngeal erythema or pus. No tenderness over ethmoid or frontal sinuses.  Ears: Bilateral TMs clear. Bilateral EACs clear.  Cardiovascular: Regular rate. Regular rhythm. No murmurs. No rubs. No gallops. Normal S1, S2.  Respiratory: Normal respiratory effort. Clear to auscultation bilaterally. No rales. No rhonchi. No wheezing.  Abdomen: Soft. Non-tender. Non-distended. Normoactive bowel sounds.  Musculoskeletal: No  obvious deformity. Pain indicated right lower leg posteriorly with no swelling or point tenderness  Extremities: No lower extremity edema.  Neurological: Alert & oriented x3. No slurred speech. Normal gait.  Psychiatric: Normal mood. Normal affect. Good insight. Good judgment.  Skin: Warm. Dry. No rash.  Neck: No cervical lymphadenopathy.           Assessment and Plan (including Health Maintenance)     :Assessment & Plan    IMPRESSION:  - Assessed patient's diabetes management; A1C remains in pre-diabetic range (5.8 in September)  - Evaluated recent GI illness; symptoms resolved  - Considered leg discomfort as potential sciatica; no signs of acute injury or vascular issues  - Determined upper respiratory symptoms likely viral; no indication for antibiotics  - " Recommend trial of gabapentin for leg discomfort, starting at low dose  - Reviewed rheumatology care; patient stable on Plaquenil, regular follow-ups with Dr. Lowry    PRE-DIABETES:  - Monitored the patient's A1C level, noting the last result was 5.8 on September 9, 2024, indicating pre-diabetes.  - Evaluated the patient's current diabetes management strategies, including metformin use, improved diet, and exercise regimen.  - Explained A1C level to the patient: normal (<5.7), pre-diabetic (5.7-6.4), and diabetic (>6.4).  - Ordered an A1C test to reassess the patient's current status.  - Instructed the patient to check [facility] for A1C results tomorrow.  - Advised the patient to continue current diabetes management strategies.  - Confirmed the patient's ongoing use of metformin for diabetes management.  - Continued metformin at the current dose of daily.    LEG DISCOMFORT:  - Evaluated the patient's right leg discomfort, possibly related to sciatic pain, which is particularly bothersome at night.  - Assessed for numbness or tingling in the leg or foot, which the patient denied.  - Prescribed gabapentin 100mg for leg discomfort.  - Instructed the patient to take 1 tablet at bedtime for the first few days.  - Advised to increase to 2 tablets at bedtime if needed.  - Recommend increasing to 3 tablets if necessary, but cautioned to monitor for daytime drowsiness.  - Suggested possible referral to a spine specialist if symptoms persist.  - Evaluated the patient's right leg discomfort, noting occasional tingling in the back of the leg.  - Assessed for pain during foot dorsiflexion and muscle palpation, which were negative.  - Prescribed gabapentin 100mg, with instructions to potentially increase to 200mg or 300mg if needed.    CHRONIC BACK PROBLEMS:  - Noted the patient's history of chronic back problems.  - Evaluated for recent back re-injury, which the patient denied.  - Considered the possibility that the leg pain  might be originating from the back.  - Noted the patient's history of back injections years ago, but nothing recent.  - Suggested possible referral to a spine specialist if symptoms persist.    SJOGREN'S SYNDROME:  - Continued Plaquenil at the current dose for Sjogren's syndrome management.  - Inquired about the patient's eye care related to Sjogren's syndrome.  - Noted that the patient regularly sees an ophthalmologist for monitoring.  - Advised the patient to maintain regular follow-ups with rheumatologist Dr. Kinjal Lowry.  - Scheduled next rheumatology follow-up for March.             Problem List Items Addressed This Visit       Sjogren's syndrome with keratoconjunctivitis sicca (Chronic)    Lumbar back pain with radiculopathy affecting right lower extremity    Prediabetes - Primary   .      Health Maintenance Due   Topic Date Due    HIV Screening  Never done    Shingles Vaccine (1 of 2) Never done    COVID-19 Vaccine (5 - 2024-25 season) 09/01/2024     Health Maintenance Topics with due status: Not Due       Topic Last Completion Date    Colorectal Cancer Screening 01/15/2018    TETANUS VACCINE 09/18/2023    Lipid Panel 05/03/2024    Hemoglobin A1c (Prediabetes) 09/09/2024    Mammogram 11/04/2024    RSV Vaccine (Age 60+ and Pregnant patients) Not Due       Procedures     Future Appointments   Date Time Provider Department Center   5/8/2025  9:00 AM Terra Larson FNP [facility]   11/10/2025  4:20 PM [facility]        Follow up in about 6 months (around 6/30/2025), or follow up sooner if right leg pain does not improve with gabapentin.     Signature:  DINESH Field    Date of encounter: 12/30/24      This note was generated with the assistance of ambient listening technology. Verbal consent was obtained by the patient and accompanying visitor(s) for the recording of patient appointment to facilitate this note. I attest to having reviewed and edited the generated  note for accuracy, though some syntax or spelling errors may persist. Please contact the author of this note for any clarification.

## 2024-12-31 NOTE — PROGRESS NOTES
Mervat  Your A1c is up to 6.0 with an average glucose of 126.  This level is acceptable, but the closer to normal you get the better.  Normal A1c is less than 5.7.  Please call if you have any questions--we will re-open on Thursday.  Terra MONTIEL

## 2025-02-11 DIAGNOSIS — E78.5 HYPERLIPIDEMIA, UNSPECIFIED HYPERLIPIDEMIA TYPE: ICD-10-CM

## 2025-02-11 RX ORDER — ATORVASTATIN CALCIUM 20 MG/1
20 TABLET, FILM COATED ORAL NIGHTLY
Qty: 90 TABLET | Refills: 3 | Status: SHIPPED | OUTPATIENT
Start: 2025-02-11

## 2025-02-11 NOTE — TELEPHONE ENCOUNTER
----- Message from Jackeline sent at 2/11/2025 12:31 PM CST -----  NEEDS REFILLS ON ATORVASTATIN CALLED IN TO [facility] [place]

## 2025-03-04 ENCOUNTER — LAB VISIT (OUTPATIENT)
Dept: PRIMARY CARE CLINIC | Facility: CLINIC | Age: 60
End: 2025-03-04

## 2025-03-04 DIAGNOSIS — Z02.83 ENCOUNTER FOR DRUG SCREENING: Primary | ICD-10-CM

## 2025-03-04 PROCEDURE — 99000 SPECIMEN HANDLING OFFICE-LAB: CPT | Mod: ,,, | Performed by: NURSE PRACTITIONER

## 2025-03-04 NOTE — PROGRESS NOTES
Patient ID: Mervat Godwin is a 59 y.o. female.    Chief Complaint: No chief complaint on file.    History of Present Illness              Physical Exam              Assessment & Plan               1. Encounter for drug screening        No follow-ups on file.    This note was generated with the assistance of ambient listening technology. Verbal consent was obtained by the patient and accompanying visitor(s) for the recording of patient appointment to facilitate this note. I attest to having reviewed and edited the generated note for accuracy, though some syntax or spelling errors may persist. Please contact the author of this note for any clarification.

## 2025-03-25 ENCOUNTER — HOSPITAL ENCOUNTER (EMERGENCY)
Facility: HOSPITAL | Age: 60
Discharge: HOME | End: 2025-03-25
Attending: EMERGENCY MEDICINE
Payer: OTHER MISCELLANEOUS

## 2025-03-25 VITALS
SYSTOLIC BLOOD PRESSURE: 144 MMHG | BODY MASS INDEX: 32.47 KG/M2 | DIASTOLIC BLOOD PRESSURE: 82 MMHG | HEIGHT: 66 IN | RESPIRATION RATE: 17 BRPM | HEART RATE: 81 BPM | TEMPERATURE: 98 F | WEIGHT: 202 LBS | OXYGEN SATURATION: 97 %

## 2025-03-25 DIAGNOSIS — M79.5 FOREIGN BODY (FB) IN SOFT TISSUE: Primary | ICD-10-CM

## 2025-03-25 PROCEDURE — 99284 EMERGENCY DEPT VISIT MOD MDM: CPT | Mod: 25

## 2025-03-25 PROCEDURE — 63600175 PHARM REV CODE 636 W HCPCS: Performed by: EMERGENCY MEDICINE

## 2025-03-25 PROCEDURE — 96372 THER/PROPH/DIAG INJ SC/IM: CPT | Performed by: EMERGENCY MEDICINE

## 2025-03-25 RX ORDER — LIDOCAINE HYDROCHLORIDE 10 MG/ML
10 INJECTION, SOLUTION EPIDURAL; INFILTRATION; INTRACAUDAL; PERINEURAL
Status: COMPLETED | OUTPATIENT
Start: 2025-03-25 | End: 2025-03-25

## 2025-03-25 RX ORDER — CEFAZOLIN SODIUM 1 G/3ML
1 INJECTION, POWDER, FOR SOLUTION INTRAMUSCULAR; INTRAVENOUS
Status: COMPLETED | OUTPATIENT
Start: 2025-03-25 | End: 2025-03-25

## 2025-03-25 RX ADMIN — LIDOCAINE HYDROCHLORIDE 100 MG: 10 INJECTION, SOLUTION EPIDURAL; INFILTRATION; INTRACAUDAL; PERINEURAL at 05:03

## 2025-03-25 RX ADMIN — CEFAZOLIN 1 G: 330 INJECTION, POWDER, FOR SOLUTION INTRAMUSCULAR; INTRAVENOUS at 06:03

## 2025-03-25 NOTE — ED PROVIDER NOTES
Encounter Date: 3/25/2025    SCRIBE #1 NOTE: I, Victorina Parra, am scribing for, and in the presence of,  Omid Barboza MD. I have scribed the entire note.       History     Chief Complaint   Patient presents with    Puncture Wound     Pt presents to ED via POV with c/o sewing machine needle puncturing left 1st/pointer finger. Sewing needle still in finger and bleeding controlled.      This is a 58 y/o female,who presents to the ED for evaluation. She states she was using her sewing machine when the needle somehow came out of the sewing machine and stuck through her left first digit. She presents to the ED with the needle still in her left index finger. There are no other complaints/pain in the ED at this time. She has a known hx of Sjogren's disease and hyperlipidemia. She has had a hysterectomy. There is no smoking hx on file.     The history is provided by the patient. No  was used.     Review of patient's allergies indicates:  No Known Allergies  Past Medical History:   Diagnosis Date    Hyperlipidemia     Influenza B 2/22/2022    Sjogren's disease      Past Surgical History:   Procedure Laterality Date    HYSTERECTOMY      OOPHORECTOMY       Family History   Problem Relation Name Age of Onset    Breast cancer Maternal Grandmother       Social History[1]  Review of Systems   Musculoskeletal:         FB in the left index finger.    All other systems reviewed and are negative.      Physical Exam     Initial Vitals [03/25/25 1659]   BP Pulse Resp Temp SpO2   (!) 144/82 81 17 98.1 °F (36.7 °C) 97 %      MAP       --         Physical Exam    Nursing note and vitals reviewed.  Constitutional: She appears well-developed and well-nourished.   HENT:   Head: Normocephalic and atraumatic.   Eyes: Conjunctivae and EOM are normal. Pupils are equal, round, and reactive to light.   Neck: Neck supple.   Normal range of motion.  Cardiovascular:  Normal rate, regular rhythm, normal heart sounds and  "intact distal pulses.           Pulmonary/Chest: Breath sounds normal.   Abdominal: Abdomen is soft. Bowel sounds are normal.   Musculoskeletal:         General: Normal range of motion.      Cervical back: Normal range of motion and neck supple.      Comments: There is a sewing needle stuck in the left index finger.        Neurological: She is alert and oriented to person, place, and time. She has normal strength.   Skin: Skin is warm and dry. Capillary refill takes less than 2 seconds.   Psychiatric: She has a normal mood and affect. Thought content normal.         ED Course   Foreign Body    Date/Time: 3/25/2025 5:11 PM    Performed by: Omid Barboza MD  Authorized by: Omid Babroza MD  Consent Done: Yes  Consent: Verbal consent obtained. Written consent obtained  Consent given by: patient  Patient identity confirmed: , name and verbally with patient  Time out: Immediately prior to procedure a "time out" was called to verify the correct patient, procedure, equipment, support staff and site/side marked as required.  Intake: Left index finger.  Anesthesia: digital block    Anesthesia:  Local Anesthetic: lidocaine 1% without epinephrine    Patient sedated: no  Patient restrained: no  Patient cooperative: yes  Complexity: simple  1 objects recovered.  Objects recovered: Sewing needle.  Post-procedure assessment: foreign body removed  Patient tolerance: Patient tolerated the procedure well with no immediate complications      Labs Reviewed - No data to display       Imaging Results              X-Ray Finger 2 or More Views Left (Final result)  Result time 25 19:02:04      Final result by Aureliano Samuels MD (25 19:02:04)                   Impression:      1. Radiopaque foreign body within the distal aspect of the 2nd digit, no associated fracture.  Correlation is needed to exclude nailbed injury.      Electronically signed by: Aureliano Samuels MD  Date:    2025  Time:    19:02          "      Narrative:    EXAMINATION:  XR FINGER 2 OR MORE VIEWS LEFT    CLINICAL HISTORY:  foreign body;    COMPARISON:  None    FINDINGS:  Three views left 2nd digit.    No acute displaced fracture or dislocation of the digit.  There is a sewing needle within the soft tissues of the distal phalanx.  No dislocation.  Which that                                       Medications   LIDOcaine (PF) 10 mg/ml (1%) injection 100 mg (100 mg Infiltration Given by Provider 3/25/25 1715)   ceFAZolin injection 1 g (1 g Intramuscular Given 3/25/25 1802)     Medical Decision Making  This is a 58 y/o female,who presents to the ED for evaluation. She states she was using her sewing machine when the needle somehow came out of the sewing machine and stuck through her left first digit. She presents to the ED with the needle still in her left index finger. There are no other complaints/pain in the ED at this time. She has a known hx of Sjogren's disease and hyperlipidemia. She has had a hysterectomy. There is no smoking hx on file.   FOREIGN BODY (NEEDLE) REMOVED IN ED.      Amount and/or Complexity of Data Reviewed  Radiology: ordered.    Risk  Prescription drug management.              Attending Attestation:           Physician Attestation for Scribe:  Physician Attestation Statement for Scribe #1: I, Omid Barboza MD, reviewed documentation, as scribed by Victorina Parra in my presence, and it is both accurate and complete.                                    Clinical Impression:  Final diagnoses:  [M79.5] Foreign body (FB) in soft tissue (Primary)          ED Disposition Condition    Discharge Stable          ED Prescriptions    None       Follow-up Information       Follow up With Specialties Details Why Contact Info    [facility] - Emergency Department Emergency Medicine  As needed [address] [place] [id number] [phone]                 [1]   Social History  Tobacco Use    Smoking status: Never      Passive exposure: Never    Smokeless tobacco: Never   Substance Use Topics    Alcohol use: Never    Drug use: Never        Omid Barboza MD  03/28/25 0819

## 2025-03-28 ENCOUNTER — OFFICE VISIT (OUTPATIENT)
Dept: FAMILY MEDICINE | Facility: CLINIC | Age: 60
End: 2025-03-28
Payer: OTHER MISCELLANEOUS

## 2025-03-28 VITALS
DIASTOLIC BLOOD PRESSURE: 71 MMHG | SYSTOLIC BLOOD PRESSURE: 123 MMHG | RESPIRATION RATE: 18 BRPM | OXYGEN SATURATION: 99 % | WEIGHT: 199.81 LBS | TEMPERATURE: 98 F | BODY MASS INDEX: 32.11 KG/M2 | HEIGHT: 66 IN | HEART RATE: 72 BPM

## 2025-03-28 DIAGNOSIS — S61.241A: Primary | ICD-10-CM

## 2025-03-28 PROCEDURE — 99213 OFFICE O/P EST LOW 20 MIN: CPT | Mod: ,,, | Performed by: NURSE PRACTITIONER

## 2025-03-28 NOTE — PROGRESS NOTES
DINESH Field   [facility]  [address]   [place] [address]     PATIENT NAME: Mervat Godwin  : 1965  DATE: 3/28/25  MRN: [id number]      Billing Provider: DINESH Field  Level of Service: PA OFFICE/OUTPT VISIT, EST, LEVL III, 20-29 MIN  Patient PCP Information       Provider PCP Type    DINESH Field General              Reason for Visit / Chief Complaint: workman comp and Puncture Wound (Works at a shoe factory in [place] and a sewing needle went through her left hand 2nd/index finger)       History of Present Illness / Problem Focused Workflow     History of Present Illness    CHIEF COMPLAINT:  Patient presents today for follow up of work-related needle injury to finger.    HISTORY OF PRESENT ILLNESS:  She sustained a needle injury to her finger on  at 4:30 PM while sewing at work, approximately 30 minutes before the end of her workday. 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 She was initially evaluated in the ER where X-ray showed no bone involvement and received an antibiotic shot. Currently, the finger is slightly tender but improving. She has been performing regular wound care with soap and water. She has not returned to work but plans to return on Monday.    MEDICAL HISTORY:  Her A1C was 6.0 in December.    IMMUNIZATIONS:  Tetanus vaccination is current, received 2 years ago.      ROS:  General: -fever, -chills, -fatigue, -weight gain, -weight loss  Eyes: -vision changes, -redness, -discharge  ENT: -ear pain, -nasal congestion, -sore throat  Cardiovascular: -chest pain, -palpitations, -lower extremity edema  Respiratory: -cough, -shortness of breath  Gastrointestinal: -abdominal pain, -nausea, -vomiting, -diarrhea, -constipation, -blood in stool  Genitourinary: -dysuria, -hematuria, -frequency  Musculoskeletal: -joint pain, -muscle pain, +limb pain  Skin: -rash, -lesion  Neurological: -headache, -dizziness, -numbness,  -tingling  Psychiatric: -anxiety, -depression, -sleep difficulty           Medical / Social / Family History     Past Medical History:   Diagnosis Date    Hyperlipidemia     Influenza B 2/22/2022    Sjogren's disease        Past Surgical History:   Procedure Laterality Date    HYSTERECTOMY      OOPHORECTOMY         Social History  Ms.  reports that she has never smoked. She has never been exposed to tobacco smoke. She has never used smokeless tobacco. She reports that she does not drink alcohol and does not use drugs.    Family History  Ms.'s family history includes Breast cancer in her maternal grandmother.    Medications and Allergies     Medications  Outpatient Medications Marked as Taking for the 3/28/25 encounter (Office Visit) with Terra Larson FNP   Medication Sig Dispense Refill    ascorbic acid, vitamin C, (VITAMIN C) 500 MG tablet Take 500 mg by mouth once daily.      atorvastatin (LIPITOR) 20 MG tablet Take 1 tablet (20 mg total) by mouth nightly. 90 tablet 3    cyanocobalamin (VITAMIN B-12) 1000 MCG tablet Take 100 mcg by mouth once daily.      dexAMETHasone (DECADRON) 0.1 % ophthalmic solution Place 1 drop into both eyes 3 (three) times daily.      difluprednate (DUREZOL) 0.05 % Drop ophthalmic solution Place 1 drop into both eyes 2 (two) times daily.      gabapentin (NEURONTIN) 100 MG capsule Take 1-2 caps at night for neuropathy. 90 capsule 2    hydrOXYchloroQUINE (PLAQUENIL) 200 mg tablet Take 200 mg by mouth 2 (two) times daily.      metFORMIN (GLUCOPHAGE-XR) 500 MG ER 24hr tablet Take 1 tablet (500 mg total) by mouth daily with breakfast. 90 tablet 3    multivit with min-folic acid 0.4 mg Tab Take 1 tablet by mouth Daily.      Saccharomyces boulardii (FLORASTOR) 250 mg capsule Take 250 mg by mouth Daily.      vitamin D (VITAMIN D3) 1000 units Tab 1 tablet Orally Once a day         Allergies  Review of patient's allergies indicates:  No Known Allergies    Physical Examination     Vitals:    03/28/25  "0847   BP: 123/71   Pulse: 72   Resp: 18   Temp: 97.7 °F (36.5 °C)   TempSrc: Oral   SpO2: 99%   Weight: 90.6 kg (199 lb 12.8 oz)   Height: 5' 6" (1.676 m)        Physical Exam    General: No acute distress. Well-developed. Well-nourished.  Eyes: EOMI. Sclerae anicteric.  HENT: Normocephalic. Atraumatic. Nares patent. Moist oral mucosa.  Ears: Bilateral TMs clear. Bilateral EACs clear.  Cardiovascular: Regular rate. Regular rhythm. No murmurs. No rubs. No gallops. Normal S1, S2. Normal heart sounds. Regular rate and rhythm.  Respiratory: Normal respiratory effort. Clear to auscultation bilaterally. No rales. No rhonchi. No wheezing.  Abdomen: Soft. Non-tender. Non-distended. Normoactive bowel sounds.  Musculoskeletal: No  obvious deformity.  Extremities: No lower extremity edema.  Neurological: Alert & oriented x3. No slurred speech. Normal gait.  Psychiatric: Normal mood. Normal affect. Good insight. Good judgment.  Skin: Warm. Dry. No rash. No erythema in finger. No swelling in finger. Mild tenderness in finger.       Physical Exam     Assessment and Plan (including Health Maintenance)     :Assessment & Plan    W46.0XXA Contact with hypodermic needle, initial encounter  S60.949A Unspecified superficial injury of unspecified finger, initial encounter  Z57.8 Occupational exposure to other risk factors  R73.03 Prediabetes  R68.89 Other general symptoms and signs    IMPRESSION:  - Evaluated finger injury from sewing machine needle.  - Reviewed ER treatment, including XR confirming no bone involvement and antibiotic shot administration.  - Assessed current condition of finger.  - Tetanus vaccination status (2 years ago) adequate.    FINGER INJURY (CONTACT WITH HYPODERMIC NEEDLE):  - Evaluated the patient's finger following a sewing machine needle injury at work on Tuesday, 25th.  - X-ray taken at ER showed no bone involvement, and the needle was removed at the ER.  - Examined the finger and found it to be not swollen " or red, just slightly tender.  - Confirmed that the patient received an antibiotic shot at the ER.  - Verified that a tetanus vaccine was not needed as the patient had one 2 years ago.  - Instructed the patient to wash the finger with regular soap and water throughout the day.  - Examined the finger and found it to be not swollen or red, just slightly tender at the tip.  - Instructed the patient on proper wound care, emphasizing regular cleaning of the injured finger with soap and water throughout the day.    OCCUPATIONAL EXPOSURE:  - Acknowledged this as a workman's compensation visit for an occupational injury from a sewing machine needle at work.  - Provided a work release for Monday.    PREDIABETES MANAGEMENT:  - Noted the patient's A1C was 6.0 in December.  - Scheduled a follow-up visit in May for labs, including an A1C check, which will be 6 months from the last check.    GENERAL PHYSICAL EXAMINATION:  - Performed a general physical exam.  - Auscultated regular heart sounds and clear lungs.             Problem List Items Addressed This Visit    None  Visit Diagnoses         Puncture wound of left index finger with foreign body    -  Primary    occurred on 3/25/25 and treated at [facility] for treatment        .      Health Maintenance Due   Topic Date Due    HIV Screening  Never done    Shingles Vaccine (1 of 2) Never done    COVID-19 Vaccine (5 - 2024-25 season) 09/01/2024     Health Maintenance Topics with due status: Not Due       Topic Last Completion Date    Colorectal Cancer Screening 01/15/2018    TETANUS VACCINE 09/18/2023    Lipid Panel 05/03/2024    Mammogram 11/04/2024    Hemoglobin A1c (Prediabetes) 12/30/2024    RSV Vaccine (Age 60+ and Pregnant patients) Not Due       Procedures     Future Appointments   Date Time Provider Department Center   5/8/2025  7:40 AM Terra Larson, DINESH [facility]   11/10/2025  4:20 PM [facility]        No follow-ups on file.      Signature:  DINESH Field    Date of encounter: 3/28/25      This note was generated with the assistance of ambient listening technology. Verbal consent was obtained by the patient and accompanying visitor(s) for the recording of patient appointment to facilitate this note. I attest to having reviewed and edited the generated note for accuracy, though some syntax or spelling errors may persist. Please contact the author of this note for any clarification.

## 2025-03-28 NOTE — LETTER
March 28, 2025      [facility] [address] [place] [address]  Phone: [phone]  Fax: [phone]       Patient: Mervat Godwin   YOB: 1965  Date of Visit: 03/28/2025    To Whom It May Concern:    Kelly Godwin  was at [facility] on 03/28/2025. The patient may return to work/school on 3/28/25 with restrictions. If you have any questions or concerns, or if I can be of further assistance, please do not hesitate to contact me.    Sincerely,    DINESH Field

## 2025-03-28 NOTE — LETTER
March 28, 2025      [facility] [address] [place] [address]  Phone: [phone]  Fax: [phone]       Patient: Mervat Godwin   YOB: 1965  Date of Visit: 03/28/2025    To Whom It May Concern:    Kelly Godwin  was at [facility] on 03/28/2025. The patient may return to work/school on 3/31/25 with no restrictions. If you have any questions or concerns, or if I can be of further assistance, please do not hesitate to contact me.    Sincerely,    DINESH Field

## 2025-05-08 ENCOUNTER — OFFICE VISIT (OUTPATIENT)
Dept: FAMILY MEDICINE | Facility: CLINIC | Age: 60
End: 2025-05-08
Payer: COMMERCIAL

## 2025-05-08 VITALS
TEMPERATURE: 98 F | WEIGHT: 199.38 LBS | HEIGHT: 66 IN | HEART RATE: 72 BPM | RESPIRATION RATE: 18 BRPM | SYSTOLIC BLOOD PRESSURE: 115 MMHG | DIASTOLIC BLOOD PRESSURE: 74 MMHG | BODY MASS INDEX: 32.04 KG/M2 | OXYGEN SATURATION: 99 %

## 2025-05-08 DIAGNOSIS — E78.2 MIXED HYPERLIPIDEMIA: Chronic | ICD-10-CM

## 2025-05-08 DIAGNOSIS — Z13.1 SCREENING FOR DIABETES MELLITUS: ICD-10-CM

## 2025-05-08 DIAGNOSIS — R73.03 PREDIABETES: Primary | ICD-10-CM

## 2025-05-08 DIAGNOSIS — Z13.220 SCREENING FOR LIPID DISORDERS: ICD-10-CM

## 2025-05-08 DIAGNOSIS — Z11.3 SCREENING FOR STD (SEXUALLY TRANSMITTED DISEASE): ICD-10-CM

## 2025-05-08 PROBLEM — M54.16 LUMBAR BACK PAIN WITH RADICULOPATHY AFFECTING RIGHT LOWER EXTREMITY: Status: RESOLVED | Noted: 2024-06-10 | Resolved: 2025-05-08

## 2025-05-08 PROBLEM — M79.5 FOREIGN BODY (FB) IN SOFT TISSUE: Status: RESOLVED | Noted: 2025-03-25 | Resolved: 2025-05-08

## 2025-05-08 LAB
ALBUMIN SERPL BCP-MCNC: 4.1 G/DL (ref 3.5–5)
ALBUMIN/GLOB SERPL: 1.1 {RATIO}
ALP SERPL-CCNC: 97 U/L (ref 40–150)
ALT SERPL W P-5'-P-CCNC: 29 U/L
ANION GAP SERPL CALCULATED.3IONS-SCNC: 13 MMOL/L (ref 7–16)
AST SERPL W P-5'-P-CCNC: 49 U/L (ref 11–45)
BASOPHILS # BLD AUTO: 0.02 K/UL (ref 0–0.2)
BASOPHILS NFR BLD AUTO: 0.7 % (ref 0–1)
BILIRUB SERPL-MCNC: 0.3 MG/DL
BUN SERPL-MCNC: 10 MG/DL (ref 10–20)
BUN/CREAT SERPL: 12 (ref 6–20)
CALCIUM SERPL-MCNC: 9.4 MG/DL (ref 8.4–10.2)
CHLORIDE SERPL-SCNC: 109 MMOL/L (ref 98–107)
CHOLEST SERPL-MCNC: 131 MG/DL
CHOLEST/HDLC SERPL: 2.3 {RATIO}
CO2 SERPL-SCNC: 24 MMOL/L (ref 22–29)
CREAT SERPL-MCNC: 0.84 MG/DL (ref 0.55–1.02)
CREAT UR-MCNC: 93 MG/DL (ref 15–325)
DIFFERENTIAL METHOD BLD: ABNORMAL
EGFR (NO RACE VARIABLE) (RUSH/TITUS): 81 ML/MIN/1.73M2
EOSINOPHIL # BLD AUTO: 0.12 K/UL (ref 0–0.5)
EOSINOPHIL NFR BLD AUTO: 4.2 % (ref 1–4)
ERYTHROCYTE [DISTWIDTH] IN BLOOD BY AUTOMATED COUNT: 13.9 % (ref 11.5–14.5)
EST. AVERAGE GLUCOSE BLD GHB EST-MCNC: 123 MG/DL
GLOBULIN SER-MCNC: 3.7 G/DL (ref 2–4)
GLUCOSE SERPL-MCNC: 89 MG/DL (ref 74–100)
HBA1C MFR BLD HPLC: 5.9 %
HCT VFR BLD AUTO: 45.9 % (ref 38–47)
HCV AB SER QL: NORMAL
HDLC SERPL-MCNC: 57 MG/DL (ref 35–60)
HGB BLD-MCNC: 14.1 G/DL (ref 12–16)
HIV 1+O+2 AB SERPL QL: NORMAL
IMM GRANULOCYTES # BLD AUTO: 0 K/UL (ref 0–0.04)
IMM GRANULOCYTES NFR BLD: 0 % (ref 0–0.4)
LDLC SERPL CALC-MCNC: 65 MG/DL
LDLC/HDLC SERPL: 1.1 {RATIO}
LYMPHOCYTES # BLD AUTO: 0.86 K/UL (ref 1–4.8)
LYMPHOCYTES NFR BLD AUTO: 29.9 % (ref 27–41)
MCH RBC QN AUTO: 25.6 PG (ref 27–31)
MCHC RBC AUTO-ENTMCNC: 30.7 G/DL (ref 32–36)
MCV RBC AUTO: 83.3 FL (ref 80–96)
MICROALBUMIN UR-MCNC: <0.5 MG/DL
MICROALBUMIN/CREAT RATIO PNL UR: NORMAL
MONOCYTES # BLD AUTO: 0.28 K/UL (ref 0–0.8)
MONOCYTES NFR BLD AUTO: 9.7 % (ref 2–6)
MPC BLD CALC-MCNC: 11.1 FL (ref 9.4–12.4)
NEUTROPHILS # BLD AUTO: 1.6 K/UL (ref 1.8–7.7)
NEUTROPHILS NFR BLD AUTO: 55.5 % (ref 53–65)
NONHDLC SERPL-MCNC: 74 MG/DL
NRBC # BLD AUTO: 0 X10E3/UL
NRBC, AUTO (.00): 0 %
PLATELET # BLD AUTO: 252 K/UL (ref 150–400)
POTASSIUM SERPL-SCNC: 3.8 MMOL/L (ref 3.5–5.1)
PROT SERPL-MCNC: 7.8 G/DL (ref 6.4–8.3)
RBC # BLD AUTO: 5.51 M/UL (ref 4.2–5.4)
SODIUM SERPL-SCNC: 142 MMOL/L (ref 136–145)
TRIGL SERPL-MCNC: 46 MG/DL (ref 37–140)
VLDLC SERPL-MCNC: 9 MG/DL
WBC # BLD AUTO: 2.88 K/UL (ref 4.5–11)

## 2025-05-08 PROCEDURE — 80053 COMPREHEN METABOLIC PANEL: CPT | Mod: ,,, | Performed by: CLINICAL MEDICAL LABORATORY

## 2025-05-08 PROCEDURE — 85025 COMPLETE CBC W/AUTO DIFF WBC: CPT | Mod: ,,, | Performed by: CLINICAL MEDICAL LABORATORY

## 2025-05-08 PROCEDURE — 82043 UR ALBUMIN QUANTITATIVE: CPT | Mod: ,,, | Performed by: CLINICAL MEDICAL LABORATORY

## 2025-05-08 PROCEDURE — 82570 ASSAY OF URINE CREATININE: CPT | Mod: ,,, | Performed by: CLINICAL MEDICAL LABORATORY

## 2025-05-08 RX ORDER — METRONIDAZOLE 500 MG/1
500 TABLET ORAL 2 TIMES DAILY
COMMUNITY
Start: 2025-04-27 | End: 2025-05-08

## 2025-05-08 RX ORDER — METRONIDAZOLE 7.5 MG/G
GEL VAGINAL
Qty: 70 G | Refills: 5 | Status: SHIPPED | OUTPATIENT
Start: 2025-05-08

## 2025-05-08 NOTE — PROGRESS NOTES
DINESH Field   [employer]  [id number] [address] [facility], [facility] [address]   [place] [address]     PATIENT NAME: Mervat Godwin  : 1965  DATE: 25  MRN: [id number]      Billing Provider: DINESH Field  Level of Service: [place] PREVENTIVE VISIT,EST,40-64  Patient PCP Information       Provider PCP Type    DINESH Field General              Reason for Visit / Chief Complaint: wellness ([facility] wellness), Health Maintenance (HIV Screening DISCUSSED/Shingles Vaccine DISCUSSED /COVID-19 Vaccine REFUSED ), and bacteria vaginosis (Pt would like a prescription with refills for this issue. Pt reports that she has this issue frequently)       History of Present Illness / Problem Focused Workflow     History of Present Illness    CHIEF COMPLAINT:  Patient presents today for annual wellness visit    GYNECOLOGIC HISTORY:  She reports recurrent bacterial vaginosis with most recent episode treated with Flagyl through [facility], which resolved symptoms. Previous episode occurred approximately two months prior. She has previously attempted long-term Flagyl cream treatment with temporary improvement followed by recurrence. She currently uses vaginal probiotics without success in preventing recurrences. Her last PAP test was normal last year. Her most recent breast exam was in October with her gynecologist.    PREVENTIVE CARE:  Her last colonoscopy at age 52 showed no polyps. She has never had a bone density test. Her last eye exam was in January.    GI:  She experiences occasional reflux with certain foods. She denies any significant problems with constipation or diarrhea.    SOCIAL HISTORY:  She reports no current sexual partner and denies sexual intercourse. She does not consume sugary drinks.      ROS:  General: -fever, -chills, -fatigue, -weight gain, -weight loss  Eyes: -vision changes, -redness, -discharge  ENT: -ear pain, -nasal congestion, -sore throat  Cardiovascular: -chest pain, -palpitations, -lower extremity  edema  Respiratory: -cough, -shortness of breath  Gastrointestinal: -abdominal pain, -nausea, -vomiting, -diarrhea, -constipation, -blood in stool, +indigestion  Genitourinary: -dysuria, -hematuria, -frequency  Musculoskeletal: -joint pain, -muscle pain  Skin: -rash, -lesion  Neurological: -headache, -dizziness, -numbness, -tingling  Psychiatric: -anxiety, -depression, -sleep difficulty           Medical / Social / Family History     Past Medical History:   Diagnosis Date    Foreign body (FB) in soft tissue 03/25/2025    Hyperlipidemia     Influenza B 02/22/2022    Sjogren's disease        Past Surgical History:   Procedure Laterality Date    HYSTERECTOMY      OOPHORECTOMY         Social History  Ms.  reports that she has never smoked. She has never been exposed to tobacco smoke. She has never used smokeless tobacco. She reports that she does not drink alcohol and does not use drugs.    Family History  Ms.'s family history includes Breast cancer in her maternal grandmother.    Medications and Allergies     Medications  Outpatient Medications Marked as Taking for the 5/8/25 encounter (Office Visit) with Terra Larson FNP   Medication Sig Dispense Refill    ascorbic acid, vitamin C, (VITAMIN C) 500 MG tablet Take 500 mg by mouth once daily.      atorvastatin (LIPITOR) 20 MG tablet Take 1 tablet (20 mg total) by mouth nightly. 90 tablet 3    cyanocobalamin (VITAMIN B-12) 1000 MCG tablet Take 100 mcg by mouth once daily.      dexAMETHasone (DECADRON) 0.1 % ophthalmic solution Place 1 drop into both eyes 3 (three) times daily.      difluprednate (DUREZOL) 0.05 % Drop ophthalmic solution Place 1 drop into both eyes 2 (two) times daily.      gabapentin (NEURONTIN) 100 MG capsule Take 1-2 caps at night for neuropathy. 90 capsule 2    hydrOXYchloroQUINE (PLAQUENIL) 200 mg tablet Take 200 mg by mouth 2 (two) times daily.      metFORMIN (GLUCOPHAGE-XR) 500 MG ER 24hr tablet Take 1 tablet (500 mg total) by mouth daily with  "breakfast. 90 tablet 3    multivit with minerals/lutein (MULTIVITAMIN 50 PLUS ORAL) 1 tablet Orally Once a day      Saccharomyces boulardii (FLORASTOR) 250 mg capsule Take 250 mg by mouth Daily.      vitamin D (VITAMIN D3) 1000 units Tab 1 tablet Orally Once a day         Allergies  Review of patient's allergies indicates:  No Known Allergies    Physical Examination     Vitals:    05/08/25 0740   BP: 115/74   Pulse: 72   Resp: 18   Temp: 98.2 °F (36.8 °C)   TempSrc: Oral   SpO2: 99%   Weight: 90.4 kg (199 lb 6.4 oz)   Height: 5' 6" (1.676 m)        Physical Exam    Vitals: BMI: 32. Obese. Blood pressure: 115/74.  General: No acute distress. Well-developed. Well-nourished.  Eyes: EOMI. Sclerae anicteric.  HENT: Normocephalic. Atraumatic. Nares patent. Moist oral mucosa. All normal.  Ears: Bilateral TMs clear. Bilateral EACs clear.  Cardiovascular: Regular rate. Regular rhythm. No murmurs. No rubs. No gallops. Normal S1, S2. Normal radial pulses.  Respiratory: Normal respiratory effort. Clear to auscultation bilaterally. No rales. No rhonchi. No wheezing.  Abdomen: Soft. Non-tender. Non-distended. Normoactive bowel sounds.  Musculoskeletal: No  obvious deformity.  Extremities: No lower extremity edema.  Neurological: Alert & oriented x3. No slurred speech. Normal gait.  Psychiatric: Normal mood. Normal affect. Good insight. Good judgment.  Skin: Warm. Dry. No rash.       Physical Exam     Laboratory     Lab Results   Component Value Date    GLU 92 05/03/2024     05/03/2024    K 3.9 05/03/2024     (H) 05/03/2024    CO2 27 05/03/2024    BUN 8 05/03/2024    CREATININE 0.65 05/03/2024    CALCIUM 9.7 05/03/2024    PROT 7.9 05/03/2024    ALBUMIN 3.9 05/03/2024    BILITOT 0.4 05/03/2024    ALKPHOS 101 05/03/2024    AST 29 05/03/2024    ALT 28 05/03/2024    ANIONGAP 10 05/03/2024    ESTGFRAFRICA 96 11/23/2021       Lab Results   Component Value Date    WBC 2.25 (L) 05/03/2024    RBC 5.34 05/03/2024    HGB 13.7 " "05/03/2024    HCT 44.5 05/03/2024    MCV 83.3 05/03/2024    RDW 13.4 05/03/2024     05/03/2024        Lab Results   Component Value Date    CHOL 150 05/03/2024    TRIG 57 05/03/2024    HDL 60 05/03/2024    LDLCALC 79 05/03/2024       No results found for: "TSH"    Lab Results   Component Value Date    HGBA1C 6.0 12/30/2024    ESTIMATEDAVG 126 12/30/2024        Lab Results   Component Value Date    FOAHDKZP44 402 05/03/2024       Lab Results   Component Value Date    RZQUUIDO53VN 36.2 05/03/2024       No results found for: "PSA"    Assessment and Plan (including Health Maintenance)     :Assessment & Plan    N76.1 Subacute and chronic vaginitis  K21.9 Gastro-esophageal reflux disease without esophagitis  E66.9 Obesity, unspecified    IMPRESSION:  - Assessed recurrent bacterial vaginosis (BV) and started metronidazole (Flagyl) vaginal gel: Use nightly for 1 week, then 2 times per week for 1 month, then 1 time a week for 3 months.  - Recommend bone density testing due to post-menopausal status to assess osteoporosis risk and determine if treatment is needed.  - Reviewed diabetes and cholesterol management, planning to reassess with updated lab work.  - Recommend further weight reduction, particularly in the midsection.    BACTERIAL VAGINOSIS:  - Patient has history of recurring bacterial vaginosis with previous Flagyl treatment providing only temporary relief and vaginal probiotics being ineffective.  - Prescribed Metronidazole (Flagyl) vaginal gel to be used nightly for 1 week, then 2 times per week for 1 month, followed by once weekly for 3 months.  - Discussed potential alternatives including switching to clindamycin or long-term Flagyl cream as maintenance therapy if current regimen is unsuccessful.    GASTROESOPHAGEAL REFLUX DISEASE:  - Patient experiences intermittent reflux primarily related to dietary choices rather than constant symptoms.  - Recommend taking antacid medication after consuming trigger " foods.    OBESITY MANAGEMENT:  - Patient's BMI is 32, indicating obesity, with a modest 1-2 pound weight loss over the past couple months.  - Discussed relationship between hormonal changes and weight distribution in women.  - Advised walking 3-5 times weekly (ideally daily), continuing to avoid sugary drinks, and reducing carbohydrate intake.    GENERAL HEALTH SCREENING AND FOLLOW-UP:  - Ordered comprehensive labs including cholesterol panel, glucose (diabetes screening), chemistry panel (liver, kidney, electrolytes), HIV screening, and urine protein test.  - Also ordered XR Hip and Spine for bone density assessment.  - Patient to follow up for mammogram in November.             Problem List Items Addressed This Visit       Hyperlipidemia (Chronic)    Prediabetes - Primary     Other Visit Diagnoses         Screening for STD (sexually transmitted disease)          Screening for diabetes mellitus          Screening for lipid disorders            .      Health Maintenance Due   Topic Date Due    HIV Screening  Never done    Shingles Vaccine (1 of 2) Never done    COVID-19 Vaccine (5 - 2024-25 season) 09/01/2024     Health Maintenance Topics with due status: Not Due       Topic Last Completion Date    Colorectal Cancer Screening 01/15/2018    TETANUS VACCINE 09/18/2023    Lipid Panel 05/03/2024    Mammogram 11/04/2024    Hemoglobin A1c (Prediabetes) 12/30/2024    RSV Vaccine (Age 60+ and Pregnant patients) Not Due       Procedures     Future Appointments   Date Time Provider Department Center   11/10/2025  4:20 PM [facility]   5/12/2026  8:00 AM Terra Larson FNP [facility]        Follow up one year for wellness and in 3-6 months for recheck of A1c    Signature:  DINESH Field    Date of encounter: 5/8/25      This note was generated with the assistance of ambient listening technology. Verbal consent was obtained by the patient and accompanying visitor(s) for the recording of  patient appointment to facilitate this note. I attest to having reviewed and edited the generated note for accuracy, though some syntax or spelling errors may persist. Please contact the author of this note for any clarification.

## 2025-05-09 ENCOUNTER — RESULTS FOLLOW-UP (OUTPATIENT)
Dept: FAMILY MEDICINE | Facility: CLINIC | Age: 60
End: 2025-05-09

## 2025-05-09 NOTE — PROGRESS NOTES
"Mervat,   Your electrolytes and kidney function all normal.  One of the liver tests is slightly elevated and will recheck that in one month, the other  liver tests are all nornal.  White blood count remains low--have I sent you to hematologist?  Sometimes this is normal in the  AA population.   Urine test is nromal.   Just for educational purposes:  . "Patient is now categorized as stage 2 0f 5 _  Chronic kidney disease (CKD).  The best way to prevent chronic kidney disease is to avoid NSAIDs  and to  keep blood pressure and blood sugar well controlled.  This should be checked annually".  Cholesterol is great!!  A1c is 5.9 and remains in pre-diabetic level.   Hiv and Hep C are both non reactive.    Please call if any questions.  Terra HUTSONP      "

## 2025-06-03 ENCOUNTER — OFFICE VISIT (OUTPATIENT)
Dept: FAMILY MEDICINE | Facility: CLINIC | Age: 60
End: 2025-06-03
Payer: COMMERCIAL

## 2025-06-03 DIAGNOSIS — U07.1 COVID: Primary | ICD-10-CM

## 2025-06-03 PROBLEM — S92.325A CLOSED NONDISPLACED FRACTURE OF SECOND METATARSAL BONE OF LEFT FOOT: Status: RESOLVED | Noted: 2025-06-03 | Resolved: 2025-06-03

## 2025-06-03 PROBLEM — S93.409A SPRAIN OF LATERAL LIGAMENT OF ANKLE JOINT: Status: RESOLVED | Noted: 2025-06-03 | Resolved: 2025-06-03

## 2025-06-03 PROBLEM — N89.8 VAGINAL ODOR: Status: RESOLVED | Noted: 2025-06-03 | Resolved: 2025-06-03

## 2025-06-03 PROCEDURE — 3044F HG A1C LEVEL LT 7.0%: CPT | Mod: CPTII,,, | Performed by: NURSE PRACTITIONER

## 2025-06-03 PROCEDURE — 99213 OFFICE O/P EST LOW 20 MIN: CPT | Mod: ,,, | Performed by: NURSE PRACTITIONER

## 2025-06-03 PROCEDURE — 1159F MED LIST DOCD IN RCRD: CPT | Mod: CPTII,,, | Performed by: NURSE PRACTITIONER

## 2025-06-03 RX ORDER — BENZONATATE 100 MG/1
100 CAPSULE ORAL 3 TIMES DAILY
COMMUNITY
Start: 2025-06-02

## 2025-06-03 RX ORDER — IPRATROPIUM BROMIDE 21 UG/1
1 SPRAY, METERED NASAL DAILY
COMMUNITY
Start: 2025-06-02

## 2025-06-06 ENCOUNTER — CLINICAL SUPPORT (OUTPATIENT)
Dept: FAMILY MEDICINE | Facility: CLINIC | Age: 60
End: 2025-06-06
Payer: COMMERCIAL

## 2025-06-06 DIAGNOSIS — Z20.822 EXPOSURE TO COVID-19 VIRUS: Primary | ICD-10-CM

## 2025-06-06 PROCEDURE — 87635 SARS-COV-2 COVID-19 AMP PRB: CPT | Mod: QW,,, | Performed by: NURSE PRACTITIONER

## 2025-06-09 LAB
CTP QC/QA: YES
SARS-COV-2 RDRP RESP QL NAA+PROBE: NEGATIVE

## 2025-06-10 ENCOUNTER — RESULTS FOLLOW-UP (OUTPATIENT)
Dept: FAMILY MEDICINE | Facility: CLINIC | Age: 60
End: 2025-06-10